# Patient Record
Sex: FEMALE | Race: BLACK OR AFRICAN AMERICAN | NOT HISPANIC OR LATINO
[De-identification: names, ages, dates, MRNs, and addresses within clinical notes are randomized per-mention and may not be internally consistent; named-entity substitution may affect disease eponyms.]

---

## 2017-02-14 ENCOUNTER — APPOINTMENT (OUTPATIENT)
Dept: RADIOLOGY | Facility: CLINIC | Age: 72
End: 2017-02-14

## 2017-02-14 ENCOUNTER — OUTPATIENT (OUTPATIENT)
Dept: OUTPATIENT SERVICES | Facility: HOSPITAL | Age: 72
LOS: 1 days | End: 2017-02-14
Payer: COMMERCIAL

## 2017-02-14 DIAGNOSIS — M16.11 UNILATERAL PRIMARY OSTEOARTHRITIS, RIGHT HIP: ICD-10-CM

## 2017-02-14 DIAGNOSIS — Z90.12 ACQUIRED ABSENCE OF LEFT BREAST AND NIPPLE: Chronic | ICD-10-CM

## 2017-02-14 DIAGNOSIS — Z90.49 ACQUIRED ABSENCE OF OTHER SPECIFIED PARTS OF DIGESTIVE TRACT: Chronic | ICD-10-CM

## 2017-02-14 PROCEDURE — 27093 INJECTION FOR HIP X-RAY: CPT

## 2017-02-14 PROCEDURE — 73525 CONTRAST X-RAY OF HIP: CPT

## 2018-10-31 ENCOUNTER — EMERGENCY (EMERGENCY)
Facility: HOSPITAL | Age: 73
LOS: 0 days | Discharge: HOME | End: 2018-10-31
Attending: EMERGENCY MEDICINE
Payer: COMMERCIAL

## 2018-10-31 VITALS
WEIGHT: 139.99 LBS | DIASTOLIC BLOOD PRESSURE: 86 MMHG | OXYGEN SATURATION: 100 % | RESPIRATION RATE: 16 BRPM | SYSTOLIC BLOOD PRESSURE: 147 MMHG | HEART RATE: 64 BPM | TEMPERATURE: 98 F | HEIGHT: 62 IN

## 2018-10-31 VITALS — HEART RATE: 60 BPM | DIASTOLIC BLOOD PRESSURE: 70 MMHG | SYSTOLIC BLOOD PRESSURE: 152 MMHG

## 2018-10-31 DIAGNOSIS — Z88.0 ALLERGY STATUS TO PENICILLIN: ICD-10-CM

## 2018-10-31 DIAGNOSIS — Z88.9 ALLERGY STATUS TO UNSPECIFIED DRUGS, MEDICAMENTS AND BIOLOGICAL SUBSTANCES: ICD-10-CM

## 2018-10-31 DIAGNOSIS — Z90.49 ACQUIRED ABSENCE OF OTHER SPECIFIED PARTS OF DIGESTIVE TRACT: Chronic | ICD-10-CM

## 2018-10-31 DIAGNOSIS — K29.70 GASTRITIS, UNSPECIFIED, WITHOUT BLEEDING: ICD-10-CM

## 2018-10-31 DIAGNOSIS — Z90.12 ACQUIRED ABSENCE OF LEFT BREAST AND NIPPLE: Chronic | ICD-10-CM

## 2018-10-31 DIAGNOSIS — R11.10 VOMITING, UNSPECIFIED: ICD-10-CM

## 2018-10-31 DIAGNOSIS — R11.2 NAUSEA WITH VOMITING, UNSPECIFIED: ICD-10-CM

## 2018-10-31 DIAGNOSIS — R10.13 EPIGASTRIC PAIN: ICD-10-CM

## 2018-10-31 LAB
ALBUMIN SERPL ELPH-MCNC: 3.4 G/DL — SIGNIFICANT CHANGE UP (ref 3.3–5)
ALP SERPL-CCNC: 124 U/L — HIGH (ref 40–120)
ALT FLD-CCNC: 20 U/L — SIGNIFICANT CHANGE UP (ref 12–78)
ANION GAP SERPL CALC-SCNC: 11 MMOL/L — SIGNIFICANT CHANGE UP (ref 5–17)
APPEARANCE UR: CLEAR — SIGNIFICANT CHANGE UP
APTT BLD: 31 SEC — SIGNIFICANT CHANGE UP (ref 27.5–36.3)
AST SERPL-CCNC: 25 U/L — SIGNIFICANT CHANGE UP (ref 15–37)
BASOPHILS # BLD AUTO: 0.03 K/UL — SIGNIFICANT CHANGE UP (ref 0–0.2)
BASOPHILS NFR BLD AUTO: 0.3 % — SIGNIFICANT CHANGE UP (ref 0–2)
BILIRUB SERPL-MCNC: 0.6 MG/DL — SIGNIFICANT CHANGE UP (ref 0.2–1.2)
BILIRUB UR-MCNC: NEGATIVE — SIGNIFICANT CHANGE UP
BUN SERPL-MCNC: 13 MG/DL — SIGNIFICANT CHANGE UP (ref 7–23)
CALCIUM SERPL-MCNC: 9 MG/DL — SIGNIFICANT CHANGE UP (ref 8.5–10.1)
CHLORIDE SERPL-SCNC: 98 MMOL/L — SIGNIFICANT CHANGE UP (ref 96–108)
CK MB BLD-MCNC: 1.3 % — SIGNIFICANT CHANGE UP (ref 0–3.5)
CK MB CFR SERPL CALC: 1.3 NG/ML — SIGNIFICANT CHANGE UP (ref 0.5–3.6)
CK SERPL-CCNC: 97 U/L — SIGNIFICANT CHANGE UP (ref 26–192)
CO2 SERPL-SCNC: 29 MMOL/L — SIGNIFICANT CHANGE UP (ref 22–31)
COLOR SPEC: YELLOW — SIGNIFICANT CHANGE UP
CREAT SERPL-MCNC: 1 MG/DL — SIGNIFICANT CHANGE UP (ref 0.5–1.3)
DIFF PNL FLD: NEGATIVE — SIGNIFICANT CHANGE UP
EOSINOPHIL # BLD AUTO: 0.05 K/UL — SIGNIFICANT CHANGE UP (ref 0–0.5)
EOSINOPHIL NFR BLD AUTO: 0.5 % — SIGNIFICANT CHANGE UP (ref 0–6)
GLUCOSE SERPL-MCNC: 111 MG/DL — HIGH (ref 70–99)
GLUCOSE UR QL: NEGATIVE MG/DL — SIGNIFICANT CHANGE UP
HCT VFR BLD CALC: 45.5 % — HIGH (ref 34.5–45)
HGB BLD-MCNC: 14.7 G/DL — SIGNIFICANT CHANGE UP (ref 11.5–15.5)
IMM GRANULOCYTES NFR BLD AUTO: 0.4 % — SIGNIFICANT CHANGE UP (ref 0–1.5)
INR BLD: 1.03 RATIO — SIGNIFICANT CHANGE UP (ref 0.88–1.16)
KETONES UR-MCNC: NEGATIVE — SIGNIFICANT CHANGE UP
LACTATE SERPL-SCNC: 1.6 MMOL/L — SIGNIFICANT CHANGE UP (ref 0.7–2)
LEUKOCYTE ESTERASE UR-ACNC: NEGATIVE — SIGNIFICANT CHANGE UP
LIDOCAIN IGE QN: 157 U/L — SIGNIFICANT CHANGE UP (ref 73–393)
LYMPHOCYTES # BLD AUTO: 1.17 K/UL — SIGNIFICANT CHANGE UP (ref 1–3.3)
LYMPHOCYTES # BLD AUTO: 11.7 % — LOW (ref 13–44)
MCHC RBC-ENTMCNC: 28.5 PG — SIGNIFICANT CHANGE UP (ref 27–34)
MCHC RBC-ENTMCNC: 32.3 GM/DL — SIGNIFICANT CHANGE UP (ref 32–36)
MCV RBC AUTO: 88.3 FL — SIGNIFICANT CHANGE UP (ref 80–100)
MONOCYTES # BLD AUTO: 0.68 K/UL — SIGNIFICANT CHANGE UP (ref 0–0.9)
MONOCYTES NFR BLD AUTO: 6.8 % — SIGNIFICANT CHANGE UP (ref 2–14)
NEUTROPHILS # BLD AUTO: 8.02 K/UL — HIGH (ref 1.8–7.4)
NEUTROPHILS NFR BLD AUTO: 80.3 % — HIGH (ref 43–77)
NITRITE UR-MCNC: NEGATIVE — SIGNIFICANT CHANGE UP
PH UR: 8 — SIGNIFICANT CHANGE UP (ref 5–8)
PLATELET # BLD AUTO: 219 K/UL — SIGNIFICANT CHANGE UP (ref 150–400)
POTASSIUM SERPL-MCNC: 3.5 MMOL/L — SIGNIFICANT CHANGE UP (ref 3.5–5.3)
POTASSIUM SERPL-SCNC: 3.5 MMOL/L — SIGNIFICANT CHANGE UP (ref 3.5–5.3)
PROT SERPL-MCNC: 8 GM/DL — SIGNIFICANT CHANGE UP (ref 6–8.3)
PROT UR-MCNC: 15 MG/DL
PROTHROM AB SERPL-ACNC: 11.5 SEC — SIGNIFICANT CHANGE UP (ref 10–12.9)
RBC # BLD: 5.15 M/UL — SIGNIFICANT CHANGE UP (ref 3.8–5.2)
RBC # FLD: 12.8 % — SIGNIFICANT CHANGE UP (ref 10.3–14.5)
SODIUM SERPL-SCNC: 138 MMOL/L — SIGNIFICANT CHANGE UP (ref 135–145)
SP GR SPEC: 1.02 — SIGNIFICANT CHANGE UP (ref 1.01–1.02)
TROPONIN I SERPL-MCNC: <.015 NG/ML — SIGNIFICANT CHANGE UP (ref 0.01–0.04)
UROBILINOGEN FLD QL: NEGATIVE MG/DL — SIGNIFICANT CHANGE UP
WBC # BLD: 9.99 K/UL — SIGNIFICANT CHANGE UP (ref 3.8–10.5)
WBC # FLD AUTO: 9.99 K/UL — SIGNIFICANT CHANGE UP (ref 3.8–10.5)

## 2018-10-31 PROCEDURE — 71045 X-RAY EXAM CHEST 1 VIEW: CPT | Mod: 26

## 2018-10-31 PROCEDURE — 99285 EMERGENCY DEPT VISIT HI MDM: CPT

## 2018-10-31 PROCEDURE — 74177 CT ABD & PELVIS W/CONTRAST: CPT | Mod: 26

## 2018-10-31 RX ORDER — FAMOTIDINE 10 MG/ML
20 INJECTION INTRAVENOUS ONCE
Qty: 0 | Refills: 0 | Status: COMPLETED | OUTPATIENT
Start: 2018-10-31 | End: 2018-10-31

## 2018-10-31 RX ORDER — SUCRALFATE 1 G
1 TABLET ORAL
Qty: 7 | Refills: 0 | OUTPATIENT
Start: 2018-10-31

## 2018-10-31 RX ORDER — SODIUM CHLORIDE 9 MG/ML
1000 INJECTION INTRAMUSCULAR; INTRAVENOUS; SUBCUTANEOUS ONCE
Qty: 0 | Refills: 0 | Status: COMPLETED | OUTPATIENT
Start: 2018-10-31 | End: 2018-10-31

## 2018-10-31 RX ORDER — ONDANSETRON 8 MG/1
8 TABLET, FILM COATED ORAL ONCE
Qty: 0 | Refills: 0 | Status: COMPLETED | OUTPATIENT
Start: 2018-10-31 | End: 2018-10-31

## 2018-10-31 RX ORDER — ONDANSETRON 8 MG/1
1 TABLET, FILM COATED ORAL
Qty: 10 | Refills: 0 | OUTPATIENT
Start: 2018-10-31

## 2018-10-31 RX ADMIN — FAMOTIDINE 20 MILLIGRAM(S): 10 INJECTION INTRAVENOUS at 19:55

## 2018-10-31 RX ADMIN — SODIUM CHLORIDE 2000 MILLILITER(S): 9 INJECTION INTRAMUSCULAR; INTRAVENOUS; SUBCUTANEOUS at 19:56

## 2018-10-31 RX ADMIN — ONDANSETRON 8 MILLIGRAM(S): 8 TABLET, FILM COATED ORAL at 19:55

## 2018-10-31 NOTE — ED PROVIDER NOTE - ATTENDING CONTRIBUTION TO CARE
Patient evaluated and seen with LUDMILA Armstrong agree with above history and physical - pt examined and seen by me personally - findings as seen: pt seen for epigastric pain nausea/vomiting without diarrhea. Pt otherwise without fever/chills. Has had prior CBD obstruction with stent exchange 2 years ago, no jaundice noted. Signed out to Dr. Berry pending CT abd/pelvis. Patient evaluated and seen with LUDMILA Armstrong agree with above history and physical - pt examined and seen by me personally - findings as seen: pt seen for epigastric pain nausea/vomiting without diarrhea. Pt otherwise without fever/chills. Has had prior CBD obstruction with stent exchange 2 years ago, no jaundice noted. Well appearing - CT abd appears to have no acute pathology - will dc with follow up with PMD or GI and given zofran/pepcid.

## 2018-10-31 NOTE — ED PROVIDER NOTE - PROGRESS NOTE DETAILS
labs, urine ok. trop neg, neg lactate .ct neg for acute pathology. pt states she is feeling better after meds. no vomiting episodes in ed. tolerating po. pt is well appearing, VSS, afebrile. pt ok for dc with pcp/GI f/u, son at bedside will take pt home  Discussed results and outcome of testing with the patient.  Patient advised to please follow up with their primary care doctor within the next 24 hours and return to the Emergency Department for worsening symptoms or any other concerns.  Patient advised that their doctor may call  to follow up on the specific results of the tests performed today in the emergency department.

## 2018-10-31 NOTE — ED PROVIDER NOTE - PHYSICAL EXAMINATION
Gen: Alert, NAD, not toxic  Head: NC, AT, PERRL, EOMI, normal lids/conjunctiva  ENT: normal hearing, patent oropharynx without erythema/exudate, uvula midline  Neck: +supple, no tenderness/meningismus/JVD, +Trachea midline  Pulm: Bilateral BS, normal resp effort, no wheeze/stridor/retractions  CV: RRR, no M/R/G, +dist pulses  Abd: soft, +epigastric tenderness, ND, +BS, no hepatosplenomegaly  Mskel: no edema/erythema/cyanosis  Skin: no rash  Neuro: AAOx3, no sensory/motor deficits, CN 2-12 intact

## 2018-10-31 NOTE — ED PROVIDER NOTE - OBJECTIVE STATEMENT
71 y/o female with PMH hiatal hernia, GERD, HTN, breast CA s/p L mastectomy 2011, CBD obstruction s/p stent here c/o n/v, epigastric pain x 1 day. pt states she had about 7 episodes of vomiting last night and today. she described pain as constant, crampy like, epigastric area without radiation of pain. had normal BM this am. no fevers. pt also states she's been urinating more frequently. no dysuria. pt otherwise denies cp, sob, HA, cough    ROS: No fever/chills. No eye pain/changes in vision, No ear pain/sore throat/dysphagia, No chest pain/palpitations. No SOB/cough/. ++ abdominal pain, N/V no D, no black/bloody bm. +frequency, No dysuria/discharge, No headache. No Dizziness.    No rashes or breaks in skin. No numbness/tingling/weakness. 73 y/o female with PMH hiatal hernia, GERD, HTN, breast CA s/p L mastectomy 2011, psh cholecystectomy years ago and CBD obstruction s/p stent exchange x2 yrs ago here c/o n/v, epigastric pain x 1 day. pt states she had about 7 episodes of vomiting last night and today. she described pain as constant, crampy like, epigastric area without radiation of pain. had normal BM this am. no fevers. pt also states she's been urinating more frequently. no dysuria. pt otherwise denies cp, sob, HA, cough    ROS: No fever/chills. No eye pain/changes in vision, No ear pain/sore throat/dysphagia, No chest pain/palpitations. No SOB/cough/. ++ abdominal pain, N/V no D, no black/bloody bm. +frequency, No dysuria/discharge, No headache. No Dizziness.    No rashes or breaks in skin. No numbness/tingling/weakness.

## 2018-10-31 NOTE — ED PROVIDER NOTE - MEDICAL DECISION MAKING DETAILS
pt here with n/v, epigastric pain, pt has hx CBD stone s/p stent, will check labs, cardiacs, ivf, antiemetics, CT, monitor

## 2018-10-31 NOTE — ED ADULT TRIAGE NOTE - CHIEF COMPLAINT QUOTE
Pt states epigastric pain with n/v/d since last night. Pt states he have hx cholecystectomy. Pt denies chest pain at triage

## 2018-11-01 LAB
CULTURE RESULTS: NO GROWTH — SIGNIFICANT CHANGE UP
SPECIMEN SOURCE: SIGNIFICANT CHANGE UP

## 2018-11-25 ENCOUNTER — INPATIENT (INPATIENT)
Facility: HOSPITAL | Age: 73
LOS: 4 days | Discharge: HOME | End: 2018-11-30
Attending: INTERNAL MEDICINE | Admitting: INTERNAL MEDICINE
Payer: COMMERCIAL

## 2018-11-25 VITALS
DIASTOLIC BLOOD PRESSURE: 79 MMHG | SYSTOLIC BLOOD PRESSURE: 185 MMHG | TEMPERATURE: 99 F | HEART RATE: 63 BPM | OXYGEN SATURATION: 99 % | RESPIRATION RATE: 16 BRPM

## 2018-11-25 DIAGNOSIS — Z90.12 ACQUIRED ABSENCE OF LEFT BREAST AND NIPPLE: Chronic | ICD-10-CM

## 2018-11-25 DIAGNOSIS — Z90.49 ACQUIRED ABSENCE OF OTHER SPECIFIED PARTS OF DIGESTIVE TRACT: Chronic | ICD-10-CM

## 2018-11-25 PROCEDURE — 99285 EMERGENCY DEPT VISIT HI MDM: CPT | Mod: 25

## 2018-11-25 NOTE — ED ADULT TRIAGE NOTE - CHIEF COMPLAINT QUOTE
pt c/o abd pain, vomited undigested food this evening, seen 1 month ago possible bowel obstruction at that time as per family (multi allergies)

## 2018-11-26 DIAGNOSIS — Z29.9 ENCOUNTER FOR PROPHYLACTIC MEASURES, UNSPECIFIED: ICD-10-CM

## 2018-11-26 DIAGNOSIS — M19.90 UNSPECIFIED OSTEOARTHRITIS, UNSPECIFIED SITE: ICD-10-CM

## 2018-11-26 DIAGNOSIS — I10 ESSENTIAL (PRIMARY) HYPERTENSION: ICD-10-CM

## 2018-11-26 DIAGNOSIS — K83.09 OTHER CHOLANGITIS: ICD-10-CM

## 2018-11-26 LAB
ALBUMIN SERPL ELPH-MCNC: 2.6 G/DL — LOW (ref 3.3–5)
ALBUMIN SERPL ELPH-MCNC: 3.4 G/DL — SIGNIFICANT CHANGE UP (ref 3.3–5)
ALP SERPL-CCNC: 196 U/L — HIGH (ref 40–120)
ALP SERPL-CCNC: 222 U/L — HIGH (ref 40–120)
ALT FLD-CCNC: 186 U/L — HIGH (ref 12–78)
ALT FLD-CCNC: 200 U/L — HIGH (ref 12–78)
ANION GAP SERPL CALC-SCNC: 11 MMOL/L — SIGNIFICANT CHANGE UP (ref 5–17)
ANION GAP SERPL CALC-SCNC: 9 MMOL/L — SIGNIFICANT CHANGE UP (ref 5–17)
APPEARANCE UR: CLEAR — SIGNIFICANT CHANGE UP
APTT BLD: 30.2 SEC — SIGNIFICANT CHANGE UP (ref 27.5–36.3)
AST SERPL-CCNC: 468 U/L — HIGH (ref 15–37)
AST SERPL-CCNC: 633 U/L — HIGH (ref 15–37)
BASOPHILS # BLD AUTO: 0.06 K/UL — SIGNIFICANT CHANGE UP (ref 0–0.2)
BASOPHILS NFR BLD AUTO: 0.2 % — SIGNIFICANT CHANGE UP (ref 0–2)
BILIRUB SERPL-MCNC: 1.6 MG/DL — HIGH (ref 0.2–1.2)
BILIRUB SERPL-MCNC: 2.5 MG/DL — HIGH (ref 0.2–1.2)
BILIRUB UR-MCNC: NEGATIVE — SIGNIFICANT CHANGE UP
BUN SERPL-MCNC: 11 MG/DL — SIGNIFICANT CHANGE UP (ref 7–23)
BUN SERPL-MCNC: 12 MG/DL — SIGNIFICANT CHANGE UP (ref 7–23)
CALCIUM SERPL-MCNC: 7.9 MG/DL — LOW (ref 8.5–10.1)
CALCIUM SERPL-MCNC: 9 MG/DL — SIGNIFICANT CHANGE UP (ref 8.5–10.1)
CHLORIDE SERPL-SCNC: 100 MMOL/L — SIGNIFICANT CHANGE UP (ref 96–108)
CHLORIDE SERPL-SCNC: 102 MMOL/L — SIGNIFICANT CHANGE UP (ref 96–108)
CK MB CFR SERPL CALC: <1 NG/ML — SIGNIFICANT CHANGE UP (ref 0.5–3.6)
CO2 SERPL-SCNC: 26 MMOL/L — SIGNIFICANT CHANGE UP (ref 22–31)
CO2 SERPL-SCNC: 26 MMOL/L — SIGNIFICANT CHANGE UP (ref 22–31)
COLOR SPEC: YELLOW — SIGNIFICANT CHANGE UP
CREAT SERPL-MCNC: 0.87 MG/DL — SIGNIFICANT CHANGE UP (ref 0.5–1.3)
CREAT SERPL-MCNC: 1.15 MG/DL — SIGNIFICANT CHANGE UP (ref 0.5–1.3)
DIFF PNL FLD: NEGATIVE — SIGNIFICANT CHANGE UP
EOSINOPHIL # BLD AUTO: 0 K/UL — SIGNIFICANT CHANGE UP (ref 0–0.5)
EOSINOPHIL NFR BLD AUTO: 0 % — SIGNIFICANT CHANGE UP (ref 0–6)
GLUCOSE SERPL-MCNC: 160 MG/DL — HIGH (ref 70–99)
GLUCOSE SERPL-MCNC: 166 MG/DL — HIGH (ref 70–99)
GLUCOSE UR QL: 50 MG/DL
HCT VFR BLD CALC: 38.5 % — SIGNIFICANT CHANGE UP (ref 34.5–45)
HCT VFR BLD CALC: 45.7 % — HIGH (ref 34.5–45)
HGB BLD-MCNC: 12.5 G/DL — SIGNIFICANT CHANGE UP (ref 11.5–15.5)
HGB BLD-MCNC: 14.8 G/DL — SIGNIFICANT CHANGE UP (ref 11.5–15.5)
IMM GRANULOCYTES NFR BLD AUTO: 0.9 % — SIGNIFICANT CHANGE UP (ref 0–1.5)
INR BLD: 1.02 RATIO — SIGNIFICANT CHANGE UP (ref 0.88–1.16)
KETONES UR-MCNC: NEGATIVE — SIGNIFICANT CHANGE UP
LACTATE SERPL-SCNC: 4.1 MMOL/L — CRITICAL HIGH (ref 0.7–2)
LEUKOCYTE ESTERASE UR-ACNC: NEGATIVE — SIGNIFICANT CHANGE UP
LIDOCAIN IGE QN: 174 U/L — SIGNIFICANT CHANGE UP (ref 73–393)
LYMPHOCYTES # BLD AUTO: 0.57 K/UL — LOW (ref 1–3.3)
LYMPHOCYTES # BLD AUTO: 2.3 % — LOW (ref 13–44)
MCHC RBC-ENTMCNC: 29 PG — SIGNIFICANT CHANGE UP (ref 27–34)
MCHC RBC-ENTMCNC: 29.4 PG — SIGNIFICANT CHANGE UP (ref 27–34)
MCHC RBC-ENTMCNC: 32.4 GM/DL — SIGNIFICANT CHANGE UP (ref 32–36)
MCHC RBC-ENTMCNC: 32.5 GM/DL — SIGNIFICANT CHANGE UP (ref 32–36)
MCV RBC AUTO: 89.3 FL — SIGNIFICANT CHANGE UP (ref 80–100)
MCV RBC AUTO: 90.9 FL — SIGNIFICANT CHANGE UP (ref 80–100)
MONOCYTES # BLD AUTO: 1.03 K/UL — HIGH (ref 0–0.9)
MONOCYTES NFR BLD AUTO: 4.1 % — SIGNIFICANT CHANGE UP (ref 2–14)
NEUTROPHILS # BLD AUTO: 23.39 K/UL — HIGH (ref 1.8–7.4)
NEUTROPHILS NFR BLD AUTO: 92.5 % — HIGH (ref 43–77)
NITRITE UR-MCNC: NEGATIVE — SIGNIFICANT CHANGE UP
NRBC # BLD: 0 /100 WBCS — SIGNIFICANT CHANGE UP (ref 0–0)
PH UR: 8 — SIGNIFICANT CHANGE UP (ref 5–8)
PLATELET # BLD AUTO: 130 K/UL — LOW (ref 150–400)
PLATELET # BLD AUTO: 171 K/UL — SIGNIFICANT CHANGE UP (ref 150–400)
POTASSIUM SERPL-MCNC: 2.8 MMOL/L — CRITICAL LOW (ref 3.5–5.3)
POTASSIUM SERPL-MCNC: 4.3 MMOL/L — SIGNIFICANT CHANGE UP (ref 3.5–5.3)
POTASSIUM SERPL-SCNC: 2.8 MMOL/L — CRITICAL LOW (ref 3.5–5.3)
POTASSIUM SERPL-SCNC: 4.3 MMOL/L — SIGNIFICANT CHANGE UP (ref 3.5–5.3)
PROT SERPL-MCNC: 6.2 GM/DL — SIGNIFICANT CHANGE UP (ref 6–8.3)
PROT SERPL-MCNC: 8.5 GM/DL — HIGH (ref 6–8.3)
PROT UR-MCNC: NEGATIVE MG/DL — SIGNIFICANT CHANGE UP
PROTHROM AB SERPL-ACNC: 11.4 SEC — SIGNIFICANT CHANGE UP (ref 10–12.9)
RBC # BLD: 4.31 M/UL — SIGNIFICANT CHANGE UP (ref 3.8–5.2)
RBC # BLD: 5.03 M/UL — SIGNIFICANT CHANGE UP (ref 3.8–5.2)
RBC # FLD: 12.9 % — SIGNIFICANT CHANGE UP (ref 10.3–14.5)
RBC # FLD: 13.2 % — SIGNIFICANT CHANGE UP (ref 10.3–14.5)
SODIUM SERPL-SCNC: 137 MMOL/L — SIGNIFICANT CHANGE UP (ref 135–145)
SODIUM SERPL-SCNC: 137 MMOL/L — SIGNIFICANT CHANGE UP (ref 135–145)
SP GR SPEC: 1.01 — SIGNIFICANT CHANGE UP (ref 1.01–1.02)
TROPONIN I SERPL-MCNC: <.015 NG/ML — SIGNIFICANT CHANGE UP (ref 0.01–0.04)
UROBILINOGEN FLD QL: NEGATIVE MG/DL — SIGNIFICANT CHANGE UP
WBC # BLD: 15.49 K/UL — HIGH (ref 3.8–10.5)
WBC # BLD: 25.27 K/UL — HIGH (ref 3.8–10.5)
WBC # FLD AUTO: 15.49 K/UL — HIGH (ref 3.8–10.5)
WBC # FLD AUTO: 25.27 K/UL — HIGH (ref 3.8–10.5)

## 2018-11-26 PROCEDURE — 74177 CT ABD & PELVIS W/CONTRAST: CPT | Mod: 26

## 2018-11-26 PROCEDURE — 71045 X-RAY EXAM CHEST 1 VIEW: CPT | Mod: 26

## 2018-11-26 PROCEDURE — 93010 ELECTROCARDIOGRAM REPORT: CPT

## 2018-11-26 PROCEDURE — 76700 US EXAM ABDOM COMPLETE: CPT | Mod: 26

## 2018-11-26 PROCEDURE — 99223 1ST HOSP IP/OBS HIGH 75: CPT

## 2018-11-26 PROCEDURE — 12345: CPT | Mod: NC

## 2018-11-26 RX ORDER — CIPROFLOXACIN LACTATE 400MG/40ML
400 VIAL (ML) INTRAVENOUS EVERY 12 HOURS
Qty: 0 | Refills: 0 | Status: DISCONTINUED | OUTPATIENT
Start: 2018-11-26 | End: 2018-11-30

## 2018-11-26 RX ORDER — IBUPROFEN 200 MG
600 TABLET ORAL EVERY 6 HOURS
Qty: 0 | Refills: 0 | Status: DISCONTINUED | OUTPATIENT
Start: 2018-11-26 | End: 2018-11-30

## 2018-11-26 RX ORDER — KETOROLAC TROMETHAMINE 30 MG/ML
15 SYRINGE (ML) INJECTION ONCE
Qty: 0 | Refills: 0 | Status: DISCONTINUED | OUTPATIENT
Start: 2018-11-26 | End: 2018-11-26

## 2018-11-26 RX ORDER — METOCLOPRAMIDE HCL 10 MG
10 TABLET ORAL ONCE
Qty: 0 | Refills: 0 | Status: COMPLETED | OUTPATIENT
Start: 2018-11-26 | End: 2018-11-26

## 2018-11-26 RX ORDER — CIPROFLOXACIN LACTATE 400MG/40ML
400 VIAL (ML) INTRAVENOUS ONCE
Qty: 0 | Refills: 0 | Status: COMPLETED | OUTPATIENT
Start: 2018-11-26 | End: 2018-11-26

## 2018-11-26 RX ORDER — SODIUM CHLORIDE 9 MG/ML
1000 INJECTION, SOLUTION INTRAVENOUS
Qty: 0 | Refills: 0 | Status: DISCONTINUED | OUTPATIENT
Start: 2018-11-26 | End: 2018-11-28

## 2018-11-26 RX ORDER — HEPARIN SODIUM 5000 [USP'U]/ML
5000 INJECTION INTRAVENOUS; SUBCUTANEOUS EVERY 8 HOURS
Qty: 0 | Refills: 0 | Status: DISCONTINUED | OUTPATIENT
Start: 2018-11-26 | End: 2018-11-28

## 2018-11-26 RX ORDER — POTASSIUM CHLORIDE 20 MEQ
40 PACKET (EA) ORAL EVERY 4 HOURS
Qty: 0 | Refills: 0 | Status: COMPLETED | OUTPATIENT
Start: 2018-11-26 | End: 2018-11-26

## 2018-11-26 RX ORDER — MORPHINE SULFATE 50 MG/1
4 CAPSULE, EXTENDED RELEASE ORAL ONCE
Qty: 0 | Refills: 0 | Status: DISCONTINUED | OUTPATIENT
Start: 2018-11-26 | End: 2018-11-26

## 2018-11-26 RX ORDER — ONDANSETRON 8 MG/1
8 TABLET, FILM COATED ORAL EVERY 8 HOURS
Qty: 0 | Refills: 0 | Status: DISCONTINUED | OUTPATIENT
Start: 2018-11-26 | End: 2018-11-30

## 2018-11-26 RX ORDER — METRONIDAZOLE 500 MG
500 TABLET ORAL ONCE
Qty: 0 | Refills: 0 | Status: COMPLETED | OUTPATIENT
Start: 2018-11-26 | End: 2018-11-26

## 2018-11-26 RX ORDER — MORPHINE SULFATE 50 MG/1
1 CAPSULE, EXTENDED RELEASE ORAL EVERY 4 HOURS
Qty: 0 | Refills: 0 | Status: DISCONTINUED | OUTPATIENT
Start: 2018-11-26 | End: 2018-11-30

## 2018-11-26 RX ORDER — FENTANYL CITRATE 50 UG/ML
50 INJECTION INTRAVENOUS ONCE
Qty: 0 | Refills: 0 | Status: DISCONTINUED | OUTPATIENT
Start: 2018-11-26 | End: 2018-11-26

## 2018-11-26 RX ORDER — KETOROLAC TROMETHAMINE 30 MG/ML
15 SYRINGE (ML) INJECTION EVERY 6 HOURS
Qty: 0 | Refills: 0 | Status: DISCONTINUED | OUTPATIENT
Start: 2018-11-26 | End: 2018-11-26

## 2018-11-26 RX ORDER — POTASSIUM CHLORIDE 20 MEQ
10 PACKET (EA) ORAL
Qty: 0 | Refills: 0 | Status: COMPLETED | OUTPATIENT
Start: 2018-11-26 | End: 2018-11-26

## 2018-11-26 RX ORDER — FAMOTIDINE 10 MG/ML
20 INJECTION INTRAVENOUS ONCE
Qty: 0 | Refills: 0 | Status: COMPLETED | OUTPATIENT
Start: 2018-11-26 | End: 2018-11-26

## 2018-11-26 RX ORDER — IOHEXOL 300 MG/ML
30 INJECTION, SOLUTION INTRAVENOUS ONCE
Qty: 0 | Refills: 0 | Status: COMPLETED | OUTPATIENT
Start: 2018-11-26 | End: 2018-11-26

## 2018-11-26 RX ORDER — METRONIDAZOLE 500 MG
500 TABLET ORAL EVERY 8 HOURS
Qty: 0 | Refills: 0 | Status: DISCONTINUED | OUTPATIENT
Start: 2018-11-26 | End: 2018-11-30

## 2018-11-26 RX ORDER — PANTOPRAZOLE SODIUM 20 MG/1
40 TABLET, DELAYED RELEASE ORAL
Qty: 0 | Refills: 0 | Status: DISCONTINUED | OUTPATIENT
Start: 2018-11-26 | End: 2018-11-30

## 2018-11-26 RX ORDER — SODIUM CHLORIDE 9 MG/ML
1000 INJECTION INTRAMUSCULAR; INTRAVENOUS; SUBCUTANEOUS ONCE
Qty: 0 | Refills: 0 | Status: COMPLETED | OUTPATIENT
Start: 2018-11-26 | End: 2018-11-26

## 2018-11-26 RX ADMIN — Medication 5 MILLIGRAM(S): at 11:52

## 2018-11-26 RX ADMIN — Medication 200 MILLIGRAM(S): at 05:08

## 2018-11-26 RX ADMIN — MORPHINE SULFATE 4 MILLIGRAM(S): 50 CAPSULE, EXTENDED RELEASE ORAL at 01:57

## 2018-11-26 RX ADMIN — FAMOTIDINE 20 MILLIGRAM(S): 10 INJECTION INTRAVENOUS at 01:57

## 2018-11-26 RX ADMIN — Medication 100 MILLIEQUIVALENT(S): at 09:01

## 2018-11-26 RX ADMIN — Medication 100 MILLIEQUIVALENT(S): at 11:01

## 2018-11-26 RX ADMIN — Medication 40 MILLIEQUIVALENT(S): at 09:01

## 2018-11-26 RX ADMIN — Medication 15 MILLIGRAM(S): at 03:33

## 2018-11-26 RX ADMIN — HEPARIN SODIUM 5000 UNIT(S): 5000 INJECTION INTRAVENOUS; SUBCUTANEOUS at 14:25

## 2018-11-26 RX ADMIN — SODIUM CHLORIDE 100 MILLILITER(S): 9 INJECTION, SOLUTION INTRAVENOUS at 07:49

## 2018-11-26 RX ADMIN — ONDANSETRON 8 MILLIGRAM(S): 8 TABLET, FILM COATED ORAL at 09:01

## 2018-11-26 RX ADMIN — Medication 200 MILLIGRAM(S): at 17:25

## 2018-11-26 RX ADMIN — Medication 100 MILLIGRAM(S): at 14:25

## 2018-11-26 RX ADMIN — Medication 100 MILLIEQUIVALENT(S): at 13:09

## 2018-11-26 RX ADMIN — HEPARIN SODIUM 5000 UNIT(S): 5000 INJECTION INTRAVENOUS; SUBCUTANEOUS at 23:07

## 2018-11-26 RX ADMIN — Medication 100 MILLIGRAM(S): at 23:07

## 2018-11-26 RX ADMIN — FENTANYL CITRATE 50 MICROGRAM(S): 50 INJECTION INTRAVENOUS at 03:34

## 2018-11-26 RX ADMIN — Medication 15 MILLIGRAM(S): at 04:29

## 2018-11-26 RX ADMIN — Medication 15 MILLIGRAM(S): at 06:53

## 2018-11-26 RX ADMIN — MORPHINE SULFATE 4 MILLIGRAM(S): 50 CAPSULE, EXTENDED RELEASE ORAL at 02:30

## 2018-11-26 RX ADMIN — SODIUM CHLORIDE 1000 MILLILITER(S): 9 INJECTION INTRAMUSCULAR; INTRAVENOUS; SUBCUTANEOUS at 02:00

## 2018-11-26 RX ADMIN — FENTANYL CITRATE 50 MICROGRAM(S): 50 INJECTION INTRAVENOUS at 04:29

## 2018-11-26 RX ADMIN — Medication 100 MILLIGRAM(S): at 06:44

## 2018-11-26 RX ADMIN — Medication 10 MILLIGRAM(S): at 01:57

## 2018-11-26 NOTE — ED PROVIDER NOTE - PHYSICAL EXAMINATION
Vitals: WNL  Gen: AAOx3, NAD, sitting comfortably in stretcher  Head: ncat, perrla, eomi b/l  Neck: supple, no lymphadenopathy, no midline deviation  Heart: rrr, no m/r/g  Lungs: CTA b/l, no rales/ronchi/wheezes  Abd: soft, tender in epigastrium, mild epigastric distension, no rebound or guarding  Ext: no clubbing/cyanosis/edema  Neuro: sensation and muscle strength intact b/l, steady gait

## 2018-11-26 NOTE — ED PROVIDER NOTE - OBJECTIVE STATEMENT
73 yo F with abd pain, n/v since this evening after eating food at [employer].  Pt. had prior bowel obstruction with similar presentation as per family member at bedside.  Same symptoms as last time.  Pt. vomited over 7 times since this evening, all unprocessed food that she ate recently.  No blood, no diarrhea.  She has severe epigastric pain, still nausea now.  No other complaints or inciting event.  She was feeling well yesterday.  ROS: negative for fever, cough, headache, chest pain, shortness of breath, abd pain, nausea, vomiting, diarrhea, rash, paresthesia, and weakness--all other systems reviewed are negative.   PMH: negative; Meds: Denies; SH: Denies smoking/drinking/drug use 71 yo F with abd pain, n/v since this evening after eating food at [employer].  Pt. had prior bowel obstruction with similar presentation as per family member at bedside.  Same symptoms as last time.  Pt. vomited over 7 times since this evening, all unprocessed food that she ate recently.  No blood, no diarrhea.  She has severe epigastric pain, still nausea now.  No other complaints or inciting event.  She was feeling well yesterday.  ROS: negative for fever, cough, headache, chest pain, shortness of breath, abd pain, nausea, vomiting, diarrhea, rash, paresthesia, and weakness--all other systems reviewed are negative.   PMH: hiatal hernia, GERD, HTN, breast CA; PSH: cholecystectomy years ago and CBD obstruction s/p stent exchange x2 yrs ago here, s/p L mastectomy 2011; Meds: See EMR; SH: Denies smoking/drinking/drug use

## 2018-11-26 NOTE — ED PROVIDER NOTE - PROGRESS NOTE DETAILS
labs and CT consistent with cholangitis, diffuse bile duct stones on CT, s/p lion  antbx started, pt. significantly improved after fentanyl and antiemetics  will admit to medicine, Dr. Paris consulted for GI

## 2018-11-26 NOTE — H&P ADULT - NSHPPHYSICALEXAM_GEN_ALL_CORE
GENERAL: NAD, well-groomed, well-developed  HEAD:  Atraumatic, Normocephalic  EYES: EOMI, PERRLA, conjunctiva and sclera clear  ENMT: No tonsillar erythema, exudates, or enlargement; Moist mucous membranes, No lesions  NECK: Supple, No JVD, Normal thyroid  NERVOUS SYSTEM:  Alert & Oriented X3, Good concentration  CHEST/LUNG: Clear to ascultation bilaterally; No rales, rhonchi, wheezing, or rubs  HEART: Regular rate and rhythm; No murmurs, rubs, or gallops  ABDOMEN: Soft, tender without rebound or guarding, Nondistended; Bowel sounds present  EXTREMITIES: no clubbing, cyanosis, or edema  SKIN: no rashes or lesions   PSYCH: normal affect and behavior

## 2018-11-26 NOTE — ED PROVIDER NOTE - CARE PLAN
Principal Discharge DX:	Vomiting, intractability of vomiting not specified, presence of nausea not specified, unspecified vomiting type Principal Discharge DX:	Cholangitis

## 2018-11-26 NOTE — H&P ADULT - PSH
Ankle Fracture  x3 surgeries  of left ankle/foot  Elbow Injury  tx with internal fixation of left elbow s/p accident in 1996  H/O left mastectomy  2011  History of cholecystectomy    umbilical hernia  with gallbladder removal Jan 2010

## 2018-11-26 NOTE — ED ADULT NURSE NOTE - OBJECTIVE STATEMENT
Pt is A&O X3, presents w/ complaints of diffuse abdominal pain associated w/ nausea, vomiting onset last night. Pt noted actively vomiting in the ED, nbnb

## 2018-11-26 NOTE — H&P ADULT - HISTORY OF PRESENT ILLNESS
Pt admitted for cholangitis, in progress 72 year old woman with PMH hiatal hernia, GERD, HTN, breast CA s/p L mastectomy 2011, psh cholecystectomy years ago and CBD obstruction s/p stent exchange x2 yrs ago here c/o n/v, epigastric pain.  She denies fever, chills, bloody stool.    In the ED, CT ab/plv shows biliary enhancement concerning for cholangitis, several bile duct stones.

## 2018-11-26 NOTE — ED ADULT NURSE NOTE - NSIMPLEMENTINTERV_GEN_ALL_ED
Implemented All Universal Safety Interventions:  [place] to call system. Call bell, personal items and telephone within reach. Instruct patient to call for assistance. Room bathroom lighting operational. Non-slip footwear when patient is off stretcher. Physically safe environment: no spills, clutter or unnecessary equipment. Stretcher in lowest position, wheels locked, appropriate side rails in place.

## 2018-11-26 NOTE — PATIENT PROFILE ADULT - NSPROEDALEARNPREFOTH_GEN_A_NUR
audio/computer/internet/skill demonstration/verbal instruction/group instruction/individual instruction/pictorial

## 2018-11-26 NOTE — PROGRESS NOTE ADULT - SUBJECTIVE AND OBJECTIVE BOX
72 year old woman with PMH hiatal hernia, OA, osteoporosis, GERD w/ Hiatal Hernia, Hypothyroid, CAD post MI, HTN, breast CA s/p L mastectomy 2011, psh cholecystectomy years ago and CBD obstruction status post cholecystectomy and stent exchange x2 yrs ago who p/w n/v & epigastric pain. Labs showed elevated LFTs. CT showedintrahepatic and extrahepatic ductal dilation due to extensive choledocholithiasis in the right hepatic duct, w/ biliary enhancement concerning for cholangitis. Exam significant for RUQ tenderness w/o guarding. Pt will need ERCP, GI is following. Pt is on Cipro and flagyl. US showed stones noted in the dilated cystic duct remanent. IV and PO KCl given for hypokalemia.

## 2018-11-26 NOTE — H&P ADULT - PROBLEM SELECTOR PLAN 1
Pt will need ERCP  Continue IVF  Zofran PRN for nausea, vomiting  Continue cipro and flagyl  GI consult  NPO  Analgesia PRN

## 2018-11-26 NOTE — ED ADULT NURSE NOTE - PMH
Acute Iritis, Right Eye    Arthritis    Autoimmune Disease  s/p diovan/hydrochlorothiazide and narcotic interaction s/p umbilical hernia with gallbladder removal. Patient currently being worked up for this.  Benign Focal Childhood Epilepsy    Breast Cancer  started work up in August 2010, dx in 12/2010, left mastectomy Feb 2011  Elbow fracture, left  metal hardware  Hiatal Hernia  gastroenteritis/ acid reflux controlled with nexium  HTN (Hypertension)  1996 dx and tx with meds  Hypothyroid    MI (myocardial infarction)  2010  Narcolepsy  during her 20's to 30's hasn't been on medications for this in over 25 years  OP (Osteoporosis)  low back since 1990's with osteopenia in right hip  Osteopenia  R-hip  Shingles  2009

## 2018-11-26 NOTE — ED PROVIDER NOTE - MEDICAL DECISION MAKING DETAILS
71 yo F with abd pain, n/v, concerning for obstruction  -CT abd/pel, basic labs, trop, cxr, ua, cx, lipase, lacate, ckmb, coags, monitor, ns hydration, pepcid, reglan, morphine  -f/u results, reeval

## 2018-11-26 NOTE — H&P ADULT - NSHPLABSRESULTS_GEN_ALL_CORE
Vital Signs Last 24 Hrs  T(C): 37.4 (2018 05:15), Max: 37.4 (2018 05:15)  T(F): 99.3 (2018 05:15), Max: 99.3 (2018 05:15)  HR: 107 (2018 06:10) (63 - 107)  BP: 143/69 (2018 05:15) (143/69 - 199/93)  BP(mean): --  RR: 12 (2018 06:10) (12 - 22)  SpO2: 95% (2018 06:10) (92% - 99%)        LABS:                        14.8   15.49 )-----------( 171      ( 2018 01:55 )             45.7         137  |  100  |  12  ----------------------------<  166<H>  4.3   |  26  |  1.15    Ca    9.0      2018 01:55    TPro  8.5<H>  /  Alb  3.4  /  TBili  1.6<H>  /  DBili  x   /  AST  633<H>  /  ALT  200<H>  /  AlkPhos  222<H>      PT/INR - ( 2018 01:55 )   PT: 11.4 sec;   INR: 1.02 ratio         PTT - ( 2018 01:55 )  PTT:30.2 sec  Urinalysis Basic - ( 2018 05:16 )    Color: Yellow / Appearance: Clear / S.010 / pH: x  Gluc: x / Ketone: Negative  / Bili: Negative / Urobili: Negative mg/dL   Blood: x / Protein: Negative mg/dL / Nitrite: Negative   Leuk Esterase: Negative / RBC: x / WBC x   Sq Epi: x / Non Sq Epi: x / Bacteria: x        RADIOLOGY & ADDITIONAL STUDIES:    CT ab/plv:  IMPRESSION: Cholecystectomy. Intrahepatic and extrahepatic ductal   dilation due to extensive choledocholithiasis in the right hepatic duct,   common bile duct and cystic duct remnant, measuring up to 14 mm. Biliary   enhancement concerning for cholangitis. ERCP recommended.

## 2018-11-26 NOTE — H&P ADULT - ASSESSMENT
Abscess of finger of left hand  06/26/2017    Active  Stoney Barnes 72 year old woman with PMH hiatal hernia, GERD, HTN, breast CA s/p L mastectomy 2011, psh cholecystectomy years ago and CBD obstruction s/p stent exchange x2 yrs ago here c/o n/v, epigastric pain.  She denies fever, chills, bloody stool.  Patient will require admission for at least 2 midnights as detailed below:    IMPROVE VTE Individual Risk Assessment          RISK                                                          Points    [  ] Previous VTE                                                3    [  ] Thrombophilia                                             2    [  ] Lower limb paralysis                                    2        (unable to hold up >15 seconds)      [  ] Current Cancer                                             2         (within 6 months)    [ x ] Immobilization > 24 hrs                              1    [  ] ICU/CCU stay > 24 hours                            1    [  x] Age > 60                                                    1    IMPROVE VTE Score ___2______

## 2018-11-26 NOTE — ED PROVIDER NOTE - PRINCIPAL DIAGNOSIS
Vomiting, intractability of vomiting not specified, presence of nausea not specified, unspecified vomiting type Cholangitis

## 2018-11-27 ENCOUNTER — TRANSCRIPTION ENCOUNTER (OUTPATIENT)
Age: 73
End: 2018-11-27

## 2018-11-27 DIAGNOSIS — L85.3 XEROSIS CUTIS: ICD-10-CM

## 2018-11-27 LAB
ALBUMIN SERPL ELPH-MCNC: 2.2 G/DL — LOW (ref 3.3–5)
ALP SERPL-CCNC: 208 U/L — HIGH (ref 40–120)
ALT FLD-CCNC: 138 U/L — HIGH (ref 12–78)
ANION GAP SERPL CALC-SCNC: 11 MMOL/L — SIGNIFICANT CHANGE UP (ref 5–17)
AST SERPL-CCNC: 195 U/L — HIGH (ref 15–37)
BILIRUB SERPL-MCNC: 5.5 MG/DL — HIGH (ref 0.2–1.2)
BUN SERPL-MCNC: 16 MG/DL — SIGNIFICANT CHANGE UP (ref 7–23)
CALCIUM SERPL-MCNC: 7.9 MG/DL — LOW (ref 8.5–10.1)
CHLORIDE SERPL-SCNC: 103 MMOL/L — SIGNIFICANT CHANGE UP (ref 96–108)
CO2 SERPL-SCNC: 22 MMOL/L — SIGNIFICANT CHANGE UP (ref 22–31)
CREAT SERPL-MCNC: 1.03 MG/DL — SIGNIFICANT CHANGE UP (ref 0.5–1.3)
CULTURE RESULTS: SIGNIFICANT CHANGE UP
GLUCOSE BLDC GLUCOMTR-MCNC: 101 MG/DL — HIGH (ref 70–99)
GLUCOSE BLDC GLUCOMTR-MCNC: 96 MG/DL — SIGNIFICANT CHANGE UP (ref 70–99)
GLUCOSE SERPL-MCNC: 114 MG/DL — HIGH (ref 70–99)
HCT VFR BLD CALC: 38.7 % — SIGNIFICANT CHANGE UP (ref 34.5–45)
HGB BLD-MCNC: 12.8 G/DL — SIGNIFICANT CHANGE UP (ref 11.5–15.5)
MAGNESIUM SERPL-MCNC: 1.9 MG/DL — SIGNIFICANT CHANGE UP (ref 1.6–2.6)
MCHC RBC-ENTMCNC: 29.2 PG — SIGNIFICANT CHANGE UP (ref 27–34)
MCHC RBC-ENTMCNC: 33.1 GM/DL — SIGNIFICANT CHANGE UP (ref 32–36)
MCV RBC AUTO: 88.2 FL — SIGNIFICANT CHANGE UP (ref 80–100)
NRBC # BLD: 0 /100 WBCS — SIGNIFICANT CHANGE UP (ref 0–0)
PHOSPHATE SERPL-MCNC: 2 MG/DL — LOW (ref 2.5–4.5)
PLATELET # BLD AUTO: 103 K/UL — LOW (ref 150–400)
POTASSIUM SERPL-MCNC: 4 MMOL/L — SIGNIFICANT CHANGE UP (ref 3.5–5.3)
POTASSIUM SERPL-SCNC: 4 MMOL/L — SIGNIFICANT CHANGE UP (ref 3.5–5.3)
PROT SERPL-MCNC: 5.8 GM/DL — LOW (ref 6–8.3)
RBC # BLD: 4.39 M/UL — SIGNIFICANT CHANGE UP (ref 3.8–5.2)
RBC # FLD: 13.4 % — SIGNIFICANT CHANGE UP (ref 10.3–14.5)
SODIUM SERPL-SCNC: 136 MMOL/L — SIGNIFICANT CHANGE UP (ref 135–145)
SPECIMEN SOURCE: SIGNIFICANT CHANGE UP
WBC # BLD: 18.93 K/UL — HIGH (ref 3.8–10.5)
WBC # FLD AUTO: 18.93 K/UL — HIGH (ref 3.8–10.5)

## 2018-11-27 PROCEDURE — 99232 SBSQ HOSP IP/OBS MODERATE 35: CPT

## 2018-11-27 PROCEDURE — 93010 ELECTROCARDIOGRAM REPORT: CPT

## 2018-11-27 RX ORDER — SOD,AMMONIUM,POTASSIUM LACTATE
1 CREAM (GRAM) TOPICAL
Qty: 0 | Refills: 0 | Status: DISCONTINUED | OUTPATIENT
Start: 2018-11-27 | End: 2018-11-30

## 2018-11-27 RX ADMIN — ONDANSETRON 8 MILLIGRAM(S): 8 TABLET, FILM COATED ORAL at 19:59

## 2018-11-27 RX ADMIN — Medication 100 MILLIGRAM(S): at 05:32

## 2018-11-27 RX ADMIN — Medication 600 MILLIGRAM(S): at 05:36

## 2018-11-27 RX ADMIN — ONDANSETRON 8 MILLIGRAM(S): 8 TABLET, FILM COATED ORAL at 05:32

## 2018-11-27 RX ADMIN — Medication 100 MILLIGRAM(S): at 14:07

## 2018-11-27 RX ADMIN — Medication 200 MILLIGRAM(S): at 17:18

## 2018-11-27 RX ADMIN — Medication 200 MILLIGRAM(S): at 05:33

## 2018-11-27 RX ADMIN — HEPARIN SODIUM 5000 UNIT(S): 5000 INJECTION INTRAVENOUS; SUBCUTANEOUS at 22:05

## 2018-11-27 RX ADMIN — Medication 1 APPLICATION(S): at 19:07

## 2018-11-27 RX ADMIN — SODIUM CHLORIDE 100 MILLILITER(S): 9 INJECTION, SOLUTION INTRAVENOUS at 14:08

## 2018-11-27 RX ADMIN — Medication 100 MILLIGRAM(S): at 22:04

## 2018-11-27 NOTE — PROGRESS NOTE ADULT - SUBJECTIVE AND OBJECTIVE BOX
Patient is a 72y old  Female who presents with a chief complaint of cholangitis (2018 18:08)      OVERNIGHT EVENTS:      REVIEW OF SYSTEMS: denies chest pain/SOB, diaphoresis, no F/C, cough, dizziness, headache, blurry vision, nausea, vomiting, abdominal pain. Rest unremarkable     MEDICATIONS  (STANDING):  ciprofloxacin   IVPB 400 milliGRAM(s) IV Intermittent every 12 hours  heparin  Injectable 5000 Unit(s) SubCutaneous every 8 hours  metroNIDAZOLE  IVPB 500 milliGRAM(s) IV Intermittent every 8 hours  pantoprazole    Tablet 40 milliGRAM(s) Oral before breakfast  predniSONE   Tablet 5 milliGRAM(s) Oral every other day  sodium chloride 0.45%. 1000 milliLiter(s) (100 mL/Hr) IV Continuous <Continuous>    MEDICATIONS  (PRN):  ibuprofen  Tablet. 600 milliGRAM(s) Oral every 6 hours PRN Temp greater or equal to 38C (100.4F), Mild Pain (1 - 3)  morphine  - Injectable 1 milliGRAM(s) IV Push every 4 hours PRN Severe Pain (7 - 10)  ondansetron Injectable 8 milliGRAM(s) IV Push every 8 hours PRN Nausea and/or Vomiting      Allergies    Diovan (Joint Pain; Muscle Pain)  Enalapril Maleate (Muscle Pain)  erythromycin (Other; Vomiting; Nausea)  Evista (Swelling)  hydrochlorothiazide (Headache; Joint Pain)  Lozol (Other)  methotrexate (Other)  Miacalcin (Pruritus)  penicillin (Angioedema; Swelling; Hives)  Premarin (Other)    Intolerances        SUBJECTIVE: in bed in NAD, no acute events overnight     T(F): 99.4 (18 @ 11:04), Max: 101.4 (18 @ 05:53)  HR: 90 (18 @ 11:04) (90 - 109)  BP: 140/68 (18 @ 11:04) (128/66 - 140/68)  RR: 16 (18 @ 11:04) (16 - 18)  SpO2: 96% (18 @ 11:04) (94% - 96%)  Wt(kg): --    PHYSICAL EXAM:  GENERAL: NAD, well-groomed, well-developed  HEAD:  Atraumatic, Normocephalic  EYES: EOMI, PERRLA, conjunctiva and sclera clear  ENMT: No tonsillar erythema, exudates, or enlargement; Moist mucous membranes, Good dentition, No lesions  NECK: Supple, No JVD, Normal thyroid  CHEST/LUNG: Clear to  auscultation bilaterally; No rales, rhonchi, wheezing, or rubs  bilaterally  HEART: Regular rate and rhythm; No murmurs, rubs, or gallops  ABDOMEN: Soft, Nontender, Nondistended; Bowel sounds present  EXTREMITIES:  2+ Peripheral Pulses, No clubbing, cyanosis, or edema BL LE  SKIN: No rashes or lesions  NERVOUS SYSTEM:  Alert & Oriented X3, Good concentration; Motor Strength 5/5 B/L upper and lower extremities;   DTRs 2+ intact and symmetric, sensation intact BL    LABS:                        12.8   18.93 )-----------( 103      ( 2018 06:22 )             38.7         136  |  103  |  16  ----------------------------<  114<H>  4.0   |  22  |  1.03    Ca    7.9<L>      2018 06:22  Phos  2.0       Mg     1.9         TPro  5.8<L>  /  Alb  2.2<L>  /  TBili  5.5<H>  /  DBili  x   /  AST  195<H>  /  ALT  138<H>  /  AlkPhos  208<H>      PT/INR - ( 2018 01:55 )   PT: 11.4 sec;   INR: 1.02 ratio         PTT - ( 2018 01:55 )  PTT:30.2 sec  Urinalysis Basic - ( 2018 05:16 )    Color: Yellow / Appearance: Clear / S.010 / pH: x  Gluc: x / Ketone: Negative  / Bili: Negative / Urobili: Negative mg/dL   Blood: x / Protein: Negative mg/dL / Nitrite: Negative   Leuk Esterase: Negative / RBC: x / WBC x   Sq Epi: x / Non Sq Epi: x / Bacteria: x      Cultures;   CAPILLARY BLOOD GLUCOSE      POCT Blood Glucose.: 101 mg/dL (2018 10:34)  POCT Blood Glucose.: 116 mg/dL (2018 07:28)  POCT Blood Glucose.: 130 mg/dL (2018 06:24)  POCT Blood Glucose.: 124 mg/dL (2018 19:25)    Lipid panel:     CARDIAC MARKERS ( 2018 01:55 )  <.015 ng/mL / x     / x     / x     / <1.0 ng/mL        RADIOLOGY & ADDITIONAL TESTS:  < from: US Abdomen Complete (18 @ 04:51) >  Cholelithiasis with thickened gallbladder wall. However, there is no   evidence for pericholecystic fluid or ultrasonic Pulliam's sign. Findings   are equivocal for acute cholecystitis. If clinically indicated, HIDA scan   may be pursued for further evaluation.    Dilated common bile duct    Trace ascites.    ADDENDUM    As per discussion with LUDMILA Diamond, the patient may have a previous   history of cholecystectomy. Stones noted in the presumed gallbladder in   the body of the report may be in the dilated cystic duct remanent   instead. Correlation with prior surgical report is recommended.    < from: CT Abdomen and Pelvis w/ Oral Cont and w/ IV Cont (18 @ 03:50) >  IMPRESSION: Cholecystectomy. Intrahepatic and extrahepatic ductal   dilation due to extensive choledocholithiasis in the right hepatic duct,   common bile duct and cystic duct remnant, measuring up to 14 mm. Biliary   enhancement concerning for cholangitis. ERCP recommended.        Imaging Personally Reviewed:  [ x] YES      Consultant(s) Notes Reviewed:  [x ] YES     Care Discussed with [x ] Consultants [X ] Patient [x ] Family  [x ]    [x ]  Other; RN Patient is a 72y old  Female who presents with a chief complaint of cholangitis (2018 18:08)      OVERNIGHT EVENTS: none      REVIEW OF SYSTEMS: denies chest pain/SOB, diaphoresis, no F/C, cough, dizziness, headache, blurry vision, nausea, vomiting, abdominal pain. Rest unremarkable     MEDICATIONS  (STANDING):  ciprofloxacin   IVPB 400 milliGRAM(s) IV Intermittent every 12 hours  heparin  Injectable 5000 Unit(s) SubCutaneous every 8 hours  metroNIDAZOLE  IVPB 500 milliGRAM(s) IV Intermittent every 8 hours  pantoprazole    Tablet 40 milliGRAM(s) Oral before breakfast  predniSONE   Tablet 5 milliGRAM(s) Oral every other day  sodium chloride 0.45%. 1000 milliLiter(s) (100 mL/Hr) IV Continuous <Continuous>    MEDICATIONS  (PRN):  ibuprofen  Tablet. 600 milliGRAM(s) Oral every 6 hours PRN Temp greater or equal to 38C (100.4F), Mild Pain (1 - 3)  morphine  - Injectable 1 milliGRAM(s) IV Push every 4 hours PRN Severe Pain (7 - 10)  ondansetron Injectable 8 milliGRAM(s) IV Push every 8 hours PRN Nausea and/or Vomiting      Allergies    Diovan (Joint Pain; Muscle Pain)  Enalapril Maleate (Muscle Pain)  erythromycin (Other; Vomiting; Nausea)  Evista (Swelling)  hydrochlorothiazide (Headache; Joint Pain)  Lozol (Other)  methotrexate (Other)  Miacalcin (Pruritus)  penicillin (Angioedema; Swelling; Hives)  Premarin (Other)    Intolerances        SUBJECTIVE: in bed in NAD, no acute events overnight     T(F): 99.4 (18 @ 11:04), Max: 101.4 (18 @ 05:53)  HR: 90 (18 @ 11:04) (90 - 109)  BP: 140/68 (18 @ 11:04) (128/66 - 140/68)  RR: 16 (18 @ 11:04) (16 - 18)  SpO2: 96% (18 @ 11:04) (94% - 96%)  Wt(kg): --    PHYSICAL EXAM:  GENERAL: NAD, well-groomed, well-developed appears chroncially ill and older than stated age .   HEAD:  Atraumatic, Normocephalic  EYES: EOMI, PERRLA, conjunctiva and sclera clear  ENMT: No tonsillar erythema, exudates, or enlargement; Moist mucous membranes, Good dentition, No lesions  NECK: Supple, No JVD, Normal thyroid  CHEST/LUNG: Clear to  auscultation bilaterally; No rales, rhonchi, wheezing, or rubs  bilaterally  HEART: Regular rate and rhythm; No murmurs, rubs, or gallops  ABDOMEN: Soft, mild RUQ tenderness +bs present  EXTREMITIES:  2+ Peripheral Pulses, No clubbing, cyanosis, or edema BL LE  SKIN: No rashes or lesions  NERVOUS SYSTEM:  Alert & Oriented X3, Good concentration; Motor Strength 5/5 B/L upper and lower extremities;   DTRs 2+ intact and symmetric, sensation intact BL    LABS:                        12.8   18.93 )-----------( 103      ( 2018 06:22 )             38.7         136  |  103  |  16  ----------------------------<  114<H>  4.0   |  22  |  1.03    Ca    7.9<L>      2018 06:22  Phos  2.0       Mg     1.9         TPro  5.8<L>  /  Alb  2.2<L>  /  TBili  5.5<H>  /  DBili  x   /  AST  195<H>  /  ALT  138<H>  /  AlkPhos  208<H>      PT/INR - ( 2018 01:55 )   PT: 11.4 sec;   INR: 1.02 ratio         PTT - ( 2018 01:55 )  PTT:30.2 sec  Urinalysis Basic - ( 2018 05:16 )    Color: Yellow / Appearance: Clear / S.010 / pH: x  Gluc: x / Ketone: Negative  / Bili: Negative / Urobili: Negative mg/dL   Blood: x / Protein: Negative mg/dL / Nitrite: Negative   Leuk Esterase: Negative / RBC: x / WBC x   Sq Epi: x / Non Sq Epi: x / Bacteria: x      Cultures;   CAPILLARY BLOOD GLUCOSE      POCT Blood Glucose.: 101 mg/dL (2018 10:34)  POCT Blood Glucose.: 116 mg/dL (2018 07:28)  POCT Blood Glucose.: 130 mg/dL (2018 06:24)  POCT Blood Glucose.: 124 mg/dL (2018 19:25)    Lipid panel:     CARDIAC MARKERS ( 2018 01:55 )  <.015 ng/mL / x     / x     / x     / <1.0 ng/mL        RADIOLOGY & ADDITIONAL TESTS:  < from: US Abdomen Complete (18 @ 04:51) >  Cholelithiasis with thickened gallbladder wall. However, there is no   evidence for pericholecystic fluid or ultrasonic Pulliam's sign. Findings   are equivocal for acute cholecystitis. If clinically indicated, HIDA scan   may be pursued for further evaluation.    Dilated common bile duct    Trace ascites.    ADDENDUM    As per discussion with LUDMILA Diamond, the patient may have a previous   history of cholecystectomy. Stones noted in the presumed gallbladder in   the body of the report may be in the dilated cystic duct remanent   instead. Correlation with prior surgical report is recommended.    < from: CT Abdomen and Pelvis w/ Oral Cont and w/ IV Cont (18 @ 03:50) >  IMPRESSION: Cholecystectomy. Intrahepatic and extrahepatic ductal   dilation due to extensive choledocholithiasis in the right hepatic duct,   common bile duct and cystic duct remnant, measuring up to 14 mm. Biliary   enhancement concerning for cholangitis. ERCP recommended.        Imaging Personally Reviewed:  [ x] YES      Consultant(s) Notes Reviewed:  [x ] YES     Care Discussed with [x ] Consultants [X ] Patient [x ] Family  [x ]    [x ]  Other; RN

## 2018-11-27 NOTE — PROGRESS NOTE ADULT - PROBLEM SELECTOR PLAN 1
Pt will need ERCP will consult Dr. Paris   Continue IVF  Zofran PRN for nausea, vomiting  Continue cipro and flagyl    Analgesia PRN Pt will need ERCP to be done by her own GI doctor Dr. Walls    Continue IVF  Zofran PRN for nausea, vomiting  Continue cipro and flagyl    Analgesia PRN Pt will need ERCP to be done by her own GI doctor Dr. Walls she is add on for today    Continue IVF  Zofran PRN for nausea, vomiting  Continue cipro and flagyl    Analgesia PRN

## 2018-11-27 NOTE — PROGRESS NOTE ADULT - ASSESSMENT
72 year old woman with PMH hiatal hernia, GERD, HTN, breast CA s/p L mastectomy 2011, psh cholecystectomy years ago and CBD obstruction s/p stent exchange x2 yrs ago here c/o n/v, epigastric pain.  She denies fever, chills, bloody stool.  Patient will require admission for at least 2 midnights as detailed below:

## 2018-11-28 DIAGNOSIS — D69.6 THROMBOCYTOPENIA, UNSPECIFIED: ICD-10-CM

## 2018-11-28 DIAGNOSIS — E83.39 OTHER DISORDERS OF PHOSPHORUS METABOLISM: ICD-10-CM

## 2018-11-28 DIAGNOSIS — E87.6 HYPOKALEMIA: ICD-10-CM

## 2018-11-28 DIAGNOSIS — E44.0 MODERATE PROTEIN-CALORIE MALNUTRITION: ICD-10-CM

## 2018-11-28 DIAGNOSIS — A41.9 SEPSIS, UNSPECIFIED ORGANISM: ICD-10-CM

## 2018-11-28 DIAGNOSIS — E87.1 HYPO-OSMOLALITY AND HYPONATREMIA: ICD-10-CM

## 2018-11-28 LAB
ALBUMIN SERPL ELPH-MCNC: 1.9 G/DL — LOW (ref 3.3–5)
ALP SERPL-CCNC: 190 U/L — HIGH (ref 40–120)
ALT FLD-CCNC: 87 U/L — HIGH (ref 12–78)
ANION GAP SERPL CALC-SCNC: 10 MMOL/L — SIGNIFICANT CHANGE UP (ref 5–17)
AST SERPL-CCNC: 80 U/L — HIGH (ref 15–37)
BILIRUB SERPL-MCNC: 4.7 MG/DL — HIGH (ref 0.2–1.2)
BUN SERPL-MCNC: 10 MG/DL — SIGNIFICANT CHANGE UP (ref 7–23)
CALCIUM SERPL-MCNC: 7.7 MG/DL — LOW (ref 8.5–10.1)
CHLORIDE SERPL-SCNC: 100 MMOL/L — SIGNIFICANT CHANGE UP (ref 96–108)
CO2 SERPL-SCNC: 22 MMOL/L — SIGNIFICANT CHANGE UP (ref 22–31)
CREAT SERPL-MCNC: 0.89 MG/DL — SIGNIFICANT CHANGE UP (ref 0.5–1.3)
GLUCOSE BLDC GLUCOMTR-MCNC: 100 MG/DL — HIGH (ref 70–99)
GLUCOSE BLDC GLUCOMTR-MCNC: 138 MG/DL — HIGH (ref 70–99)
GLUCOSE BLDC GLUCOMTR-MCNC: 83 MG/DL — SIGNIFICANT CHANGE UP (ref 70–99)
GLUCOSE SERPL-MCNC: 301 MG/DL — HIGH (ref 70–99)
HCT VFR BLD CALC: 38.9 % — SIGNIFICANT CHANGE UP (ref 34.5–45)
HGB BLD-MCNC: 12.5 G/DL — SIGNIFICANT CHANGE UP (ref 11.5–15.5)
MAGNESIUM SERPL-MCNC: 2.1 MG/DL — SIGNIFICANT CHANGE UP (ref 1.6–2.6)
MCHC RBC-ENTMCNC: 28.5 PG — SIGNIFICANT CHANGE UP (ref 27–34)
MCHC RBC-ENTMCNC: 32.1 GM/DL — SIGNIFICANT CHANGE UP (ref 32–36)
MCV RBC AUTO: 88.6 FL — SIGNIFICANT CHANGE UP (ref 80–100)
NRBC # BLD: 0 /100 WBCS — SIGNIFICANT CHANGE UP (ref 0–0)
PHOSPHATE SERPL-MCNC: 1.4 MG/DL — LOW (ref 2.5–4.5)
PLATELET # BLD AUTO: 87 K/UL — LOW (ref 150–400)
POTASSIUM SERPL-MCNC: 3.3 MMOL/L — LOW (ref 3.5–5.3)
POTASSIUM SERPL-SCNC: 3.3 MMOL/L — LOW (ref 3.5–5.3)
PROT SERPL-MCNC: 5.5 GM/DL — LOW (ref 6–8.3)
RBC # BLD: 4.39 M/UL — SIGNIFICANT CHANGE UP (ref 3.8–5.2)
RBC # FLD: 13.5 % — SIGNIFICANT CHANGE UP (ref 10.3–14.5)
SODIUM SERPL-SCNC: 132 MMOL/L — LOW (ref 135–145)
WBC # BLD: 13.38 K/UL — HIGH (ref 3.8–10.5)
WBC # FLD AUTO: 13.38 K/UL — HIGH (ref 3.8–10.5)

## 2018-11-28 PROCEDURE — 71045 X-RAY EXAM CHEST 1 VIEW: CPT | Mod: 26

## 2018-11-28 PROCEDURE — 99233 SBSQ HOSP IP/OBS HIGH 50: CPT

## 2018-11-28 RX ORDER — SODIUM CHLORIDE 9 MG/ML
1000 INJECTION INTRAMUSCULAR; INTRAVENOUS; SUBCUTANEOUS
Qty: 0 | Refills: 0 | Status: COMPLETED | OUTPATIENT
Start: 2018-11-28 | End: 2018-11-28

## 2018-11-28 RX ORDER — SODIUM CHLORIDE 9 MG/ML
1000 INJECTION, SOLUTION INTRAVENOUS
Qty: 0 | Refills: 0 | Status: DISCONTINUED | OUTPATIENT
Start: 2018-11-28 | End: 2018-11-28

## 2018-11-28 RX ORDER — POTASSIUM PHOSPHATE, MONOBASIC POTASSIUM PHOSPHATE, DIBASIC 236; 224 MG/ML; MG/ML
15 INJECTION, SOLUTION INTRAVENOUS ONCE
Qty: 0 | Refills: 0 | Status: COMPLETED | OUTPATIENT
Start: 2018-11-28 | End: 2018-11-29

## 2018-11-28 RX ORDER — AMLODIPINE BESYLATE 2.5 MG/1
10 TABLET ORAL DAILY
Qty: 0 | Refills: 0 | Status: DISCONTINUED | OUTPATIENT
Start: 2018-11-28 | End: 2018-11-29

## 2018-11-28 RX ORDER — ONDANSETRON 8 MG/1
4 TABLET, FILM COATED ORAL ONCE
Qty: 0 | Refills: 0 | Status: COMPLETED | OUTPATIENT
Start: 2018-11-28 | End: 2018-11-28

## 2018-11-28 RX ORDER — POTASSIUM CHLORIDE 20 MEQ
10 PACKET (EA) ORAL
Qty: 0 | Refills: 0 | Status: COMPLETED | OUTPATIENT
Start: 2018-11-28 | End: 2018-11-28

## 2018-11-28 RX ADMIN — SODIUM CHLORIDE 50 MILLILITER(S): 9 INJECTION INTRAMUSCULAR; INTRAVENOUS; SUBCUTANEOUS at 22:08

## 2018-11-28 RX ADMIN — Medication 5 MILLIGRAM(S): at 12:58

## 2018-11-28 RX ADMIN — Medication 100 MILLIGRAM(S): at 05:55

## 2018-11-28 RX ADMIN — Medication 1 APPLICATION(S): at 18:34

## 2018-11-28 RX ADMIN — AMLODIPINE BESYLATE 10 MILLIGRAM(S): 2.5 TABLET ORAL at 18:37

## 2018-11-28 RX ADMIN — HEPARIN SODIUM 5000 UNIT(S): 5000 INJECTION INTRAVENOUS; SUBCUTANEOUS at 05:55

## 2018-11-28 RX ADMIN — Medication 100 MILLIGRAM(S): at 22:08

## 2018-11-28 RX ADMIN — Medication 100 MILLIEQUIVALENT(S): at 18:49

## 2018-11-28 RX ADMIN — SODIUM CHLORIDE 100 MILLILITER(S): 9 INJECTION, SOLUTION INTRAVENOUS at 01:28

## 2018-11-28 RX ADMIN — HEPARIN SODIUM 5000 UNIT(S): 5000 INJECTION INTRAVENOUS; SUBCUTANEOUS at 13:08

## 2018-11-28 RX ADMIN — Medication 100 MILLIEQUIVALENT(S): at 20:25

## 2018-11-28 RX ADMIN — Medication 200 MILLIGRAM(S): at 04:08

## 2018-11-28 RX ADMIN — Medication 100 MILLIEQUIVALENT(S): at 22:08

## 2018-11-28 RX ADMIN — SODIUM CHLORIDE 100 MILLILITER(S): 9 INJECTION, SOLUTION INTRAVENOUS at 00:48

## 2018-11-28 RX ADMIN — ONDANSETRON 4 MILLIGRAM(S): 8 TABLET, FILM COATED ORAL at 11:39

## 2018-11-28 RX ADMIN — Medication 100 MILLIGRAM(S): at 13:08

## 2018-11-28 RX ADMIN — Medication 200 MILLIGRAM(S): at 17:10

## 2018-11-28 RX ADMIN — Medication 1 APPLICATION(S): at 05:55

## 2018-11-28 NOTE — PROGRESS NOTE ADULT - PROBLEM SELECTOR PLAN 9
POA .. and this accounts for elevated lactate levels. will repeat in am    continue with antibx   wbc downtrending

## 2018-11-28 NOTE — PROGRESS NOTE ADULT - SUBJECTIVE AND OBJECTIVE BOX
Patient is a 72y old  Female who presents with a chief complaint of cholangitis (26 Nov 2018 18:08)      OVERNIGHT EVENTS: none    MEDICATIONS  (STANDING):  ammonium lactate 12% Lotion 1 Application(s) Topical two times a day  ciprofloxacin   IVPB 400 milliGRAM(s) IV Intermittent every 12 hours  heparin  Injectable 5000 Unit(s) SubCutaneous every 8 hours  metroNIDAZOLE  IVPB 500 milliGRAM(s) IV Intermittent every 8 hours  pantoprazole    Tablet 40 milliGRAM(s) Oral before breakfast  potassium chloride  10 mEq/100 mL IVPB 10 milliEquivalent(s) IV Intermittent every 1 hour  potassium phosphate IVPB 15 milliMole(s) IV Intermittent once  predniSONE   Tablet 5 milliGRAM(s) Oral every other day  sodium chloride 0.9%. 1000 milliLiter(s) (50 mL/Hr) IV Continuous <Continuous>    MEDICATIONS  (PRN):  ibuprofen  Tablet. 600 milliGRAM(s) Oral every 6 hours PRN Temp greater or equal to 38C (100.4F), Mild Pain (1 - 3)  morphine  - Injectable 1 milliGRAM(s) IV Push every 4 hours PRN Severe Pain (7 - 10)  ondansetron Injectable 8 milliGRAM(s) IV Push every 8 hours PRN Nausea and/or Vomiting    Allergies    Diovan (Joint Pain; Muscle Pain)  Enalapril Maleate (Muscle Pain)  erythromycin (Other; Vomiting; Nausea)  Evista (Swelling)  hydrochlorothiazide (Headache; Joint Pain)  Lozol (Other)  methotrexate (Other)  Miacalcin (Pruritus)  penicillin (Angioedema; Swelling; Hives)  Premarin (Other)    Intolerances        SUBJECTIVE: in bed in NAD, no acute events overnight     Vital Signs Last 24 Hrs  T(C): 37 (28 Nov 2018 16:39), Max: 37 (28 Nov 2018 05:02)  T(F): 98.6 (28 Nov 2018 16:39), Max: 98.6 (28 Nov 2018 05:02)  HR: 76 (28 Nov 2018 16:39) (73 - 90)  BP: 182/96 (28 Nov 2018 16:39) (121/82 - 182/96)  BP(mean): --  RR: 20 (28 Nov 2018 16:39) (16 - 22)  SpO2: 96% (28 Nov 2018 16:39) (96% - 100%)    PHYSICAL EXAM:  GENERAL: NAD, well-groomed, well-developed appears chroncially ill and older than stated age .   HEAD:  Atraumatic, Normocephalic  EYES: EOMI, PERRLA, conjunctiva and sclera clear  ENMT: No tonsillar erythema, exudates, or enlargement; Moist mucous membranes, Good dentition, No lesions  NECK: Supple, No JVD, Normal thyroid  CHEST/LUNG: Clear to  auscultation bilaterally; No rales, rhonchi, wheezing, or rubs  bilaterally  HEART: Regular rate and rhythm; No murmurs, rubs, or gallops  ABDOMEN: Soft, mild RUQ tenderness +bs present  EXTREMITIES:  2+ Peripheral Pulses, No clubbing, cyanosis, or edema BL LE  SKIN: No rashes or lesions  NERVOUS SYSTEM:  Alert & Oriented X3, Good concentration; Motor Strength 5/5 B/L upper and lower extremities;   DTRs 2+ intact and symmetric, sensation intact BL    LABS:                                     12.5   13.38 )-----------( 87       ( 28 Nov 2018 06:21 )             38.9   11-28    132<L>  |  100  |  10  ----------------------------<  301<H>  3.3<L>   |  22  |  0.89    Ca    7.7<L>      28 Nov 2018 06:21  Phos  1.4     11-28  Mg     2.1     11-28    TPro  5.5<L>  /  Alb  1.9<L>  /  TBili  4.7<H>  /  DBili  x   /  AST  80<H>  /  ALT  87<H>  /  AlkPhos  190<H>  11-28      Culture - Urine (collected 26 Nov 2018 08:31)  Source: .Urine Clean Catch (Midstream)  Final Report (27 Nov 2018 10:30):    <10,000 CFU/ml Normal Urogenital briseida present      Cultures;   CAPILLARY BLOOD GLUCOSE    CAPILLARY BLOOD GLUCOSE      POCT Blood Glucose.: 100 mg/dL (28 Nov 2018 07:16)  POCT Blood Glucose.: 83 mg/dL (28 Nov 2018 00:45)    Lipid panel:     CARDIAC MARKERS ( 26 Nov 2018 01:55 )  <.015 ng/mL / x     / x     / x     / <1.0 ng/mL        RADIOLOGY & ADDITIONAL TESTS:  < from: US Abdomen Complete (11.26.18 @ 04:51) >  Cholelithiasis with thickened gallbladder wall. However, there is no   evidence for pericholecystic fluid or ultrasonic Pulliam's sign. Findings   are equivocal for acute cholecystitis. If clinically indicated, HIDA scan   may be pursued for further evaluation.    Dilated common bile duct    Trace ascites.    ADDENDUM    As per discussion with LUDMILA Diamond, the patient may have a previous   history of cholecystectomy. Stones noted in the presumed gallbladder in   the body of the report may be in the dilated cystic duct remanent   instead. Correlation with prior surgical report is recommended.    < from: CT Abdomen and Pelvis w/ Oral Cont and w/ IV Cont (11.26.18 @ 03:50) >  IMPRESSION: Cholecystectomy. Intrahepatic and extrahepatic ductal   dilation due to extensive choledocholithiasis in the right hepatic duct,   common bile duct and cystic duct remnant, measuring up to 14 mm. Biliary   enhancement concerning for cholangitis. ERCP recommended.        Imaging Personally Reviewed:  [ x] YES      Consultant(s) Notes Reviewed:  [x ] YES     Care Discussed with [x ] Consultants [X ] Patient [x ] Family  [x ]    [x ]  Other; RN

## 2018-11-28 NOTE — DIETITIAN INITIAL EVALUATION ADULT. - PERTINENT LABORATORY DATA
11-28 Na132 mmol/L<L> Glu 301 mg/dL<H> K+ 3.3 mmol/L<L> Cr  0.89 mg/dL BUN 10 mg/dL 11-28 Phos 1.4 mg/dL<L> 11-28 Alb 1.9 g/dL<L>11-28 ALT 87 U/L<H> AST 80 U/L<H> Alkaline Phosphatase 190 U/L<H> 11-28 Hgb 12.5 g/dL Hct 38.9 %

## 2018-11-28 NOTE — CHART NOTE - NSCHARTNOTEFT_GEN_A_CORE
Upon Nutritional Assessment by the Registered Dietitian your patient was determined to meet criteria / has evidence of the following diagnosis/diagnoses:          [ ]  Mild Protein Calorie Malnutrition        [x ]  Moderate Protein Calorie Malnutrition        [ ] Severe Protein Calorie Malnutrition        [ ] Unspecified Protein Calorie Malnutrition        [ ] Underweight / BMI <19        [ ] Morbid Obesity / BMI > 40      Findings as based on:  •  Comprehensive nutrition assessment and consultation  •  Calorie counts (nutrient intake analysis)  •  Food acceptance and intake status from observations by staff  •  Follow up  •  Patient education  •  Intervention secondary to interdisciplinary rounds  •   concerns      Treatment:    The following diet has been recommended:  advance diet when appropriate to clear fluid to low fat, low sodium, Dysphagia 3 Soft - Thin Liquids c Ensure Clear(berry flavor) 8 oz 2x/day (480 breanne, 16 gm pro)       PROVIDER Section:     By signing this assessment you are acknowledging and agree with the diagnosis/diagnoses assigned by the Registered Dietitian    Comments:

## 2018-11-28 NOTE — DIETITIAN INITIAL EVALUATION ADULT. - ETIOLOGY
inadequate protein-energy intake in setting of hx of breast cancer, altered chew, GERD, CBD obstruction

## 2018-11-28 NOTE — DIETITIAN INITIAL EVALUATION ADULT. - PERTINENT MEDS FT
MEDICATIONS  (STANDING):  ammonium lactate 12% Lotion 1 Application(s) Topical two times a day  ciprofloxacin   IVPB 400 milliGRAM(s) IV Intermittent every 12 hours  heparin  Injectable 5000 Unit(s) SubCutaneous every 8 hours  lactated ringers. 1000 milliLiter(s) (125 mL/Hr) IV Continuous <Continuous>  metroNIDAZOLE  IVPB 500 milliGRAM(s) IV Intermittent every 8 hours  pantoprazole    Tablet 40 milliGRAM(s) Oral before breakfast  predniSONE   Tablet 5 milliGRAM(s) Oral every other day    MEDICATIONS  (PRN):  ibuprofen  Tablet. 600 milliGRAM(s) Oral every 6 hours PRN Temp greater or equal to 38C (100.4F), Mild Pain (1 - 3)  morphine  - Injectable 1 milliGRAM(s) IV Push every 4 hours PRN Severe Pain (7 - 10)  ondansetron Injectable 8 milliGRAM(s) IV Push every 8 hours PRN Nausea and/or Vomiting  ondansetron Injectable 4 milliGRAM(s) IV Push once PRN Nausea and/or Vomiting

## 2018-11-28 NOTE — PROGRESS NOTE ADULT - PROBLEM SELECTOR PLAN 1
s/p ercp on 11/28/18 case d/w gi and had sludge pus and stent placed . will need f/u in 2-3 weeks with GI for removal .   reviewed admission labs and no blood cultures were done I will obtain . continue with antibx   wbc is improving         Continue IVF  Zofran PRN for nausea, vomiting  Continue cipro and flagyl    Analgesia PRN

## 2018-11-28 NOTE — DIETITIAN INITIAL EVALUATION ADULT. - OTHER INFO
Pt seen due to RN consult for significant decrease in oral intake > 5 days PTA.  Pt reports depressed intake 1 day PTA.  Pt reports many food Food allergies/intolerances.  Pt at present reports allergy/intolerance to corn, tomato, apple, oranges, shannon greens &  Ensure.  Pt s/p ERCP today, pt has been NPO.  Pt was taking  vitamin C, vitamin D, calcium supplement and aloe vera PTA.  Pt made aware to discuss all over the counter meds c MD.

## 2018-11-28 NOTE — CHART NOTE - NSCHARTNOTEFT_GEN_A_CORE
finger stick 82   pt is npo for procedure   will change fluids from 1/2 nl to d5 1/2 nl continue rate of 100 finger stick 82   pt is npo for procedure   will change fluids from 1/2 nl to d5 1/2 nl continue rate of 100  continue to monitor blood sugar

## 2018-11-28 NOTE — DIETITIAN INITIAL EVALUATION ADULT. - ENERGY NEEDS
Height (cm): 157.48 (11-26)  Weight (kg): 63.7 (11-26)  BMI (kg/m2): 25.7 (11-26)  IBW: 49.8 kg        % IBW: 127%            UBW:              %UBW  wt. loss of 2.6 kg since adm noted Height (cm): 157.48 (11-26)  Weight (kg): 63.7 (11-26)  BMI (kg/m2): 25.7 (11-26)  IBW: 49.8 kg        % IBW: 127%            UBW: 80.7 kg              %UBW: 78%  wt. loss of 2.6 kg since adm noted, 1+ edema of feet noted

## 2018-11-28 NOTE — PROGRESS NOTE ADULT - PROBLEM SELECTOR PLAN 10
etiology unclear if due to liver dysfunction which is [id number]  vs sepsis.    will stop heparin check  HIT and and  monitor platelets.

## 2018-11-28 NOTE — DIETITIAN INITIAL EVALUATION ADULT. - NS AS NUTRI INTERV MEALS SNACK
Texture-modified diet/Mineral - modified diet/Fluid - modified diet/Recommend advance diet as tolerated to clear fluid to low fat, low sodium, Dysphagia 3 Soft - Thin Liquids

## 2018-11-28 NOTE — DIETITIAN INITIAL EVALUATION ADULT. - PHYSICAL APPEARANCE
BMI=25.7(11/26-for wt. of 63.7 kg)/other (specify) other (specify)/BMI=25.7(11/26-for wt. of 63.7 kg), Nutrition focused physical exam conducted; Subcutaneous fat Exam;  [ moderate  ]  Orbital fat pads region,  [ moderate ]Buccal fat region,  [ moderate ]triceps region, [WNL  ]ribs region.  Muscle Exam; [  moderate]temples region, [moderate   ]clavicle region, [ moderate ]shoulder region, [mild  ]Scapula region, [mild  ]Interosseous region, [ WNL ]thigh region, [ unable ]Calf region

## 2018-11-28 NOTE — PROGRESS NOTE ADULT - PROBLEM SELECTOR PLAN 2
Diet controlled BP is elevated and patient w/o pain  will start ACEI   Lisinopril 10mg po daily BP is elevated and patient w/o pain  will start norvasc

## 2018-11-28 NOTE — DIETITIAN INITIAL EVALUATION ADULT. - FACTORS AFF FOOD INTAKE
other (specify)/difficulty chewing/due to dental problems due to autoimmune disease after taking Hydrochlorthiazide c percocet 7-8 years ago, eats soft / ground foods as tolerated, abdominal pain, N/V on Sunday 11/25

## 2018-11-29 LAB
ALBUMIN SERPL ELPH-MCNC: 1.9 G/DL — LOW (ref 3.3–5)
ALP SERPL-CCNC: 190 U/L — HIGH (ref 40–120)
ALT FLD-CCNC: 68 U/L — SIGNIFICANT CHANGE UP (ref 12–78)
ANION GAP SERPL CALC-SCNC: 8 MMOL/L — SIGNIFICANT CHANGE UP (ref 5–17)
AST SERPL-CCNC: 51 U/L — HIGH (ref 15–37)
BILIRUB DIRECT SERPL-MCNC: 1.86 MG/DL — HIGH (ref 0.05–0.2)
BILIRUB INDIRECT FLD-MCNC: 0.5 MG/DL — SIGNIFICANT CHANGE UP (ref 0.2–1)
BILIRUB SERPL-MCNC: 2.4 MG/DL — HIGH (ref 0.2–1.2)
BUN SERPL-MCNC: 12 MG/DL — SIGNIFICANT CHANGE UP (ref 7–23)
CALCIUM SERPL-MCNC: 8 MG/DL — LOW (ref 8.5–10.1)
CHLORIDE SERPL-SCNC: 108 MMOL/L — SIGNIFICANT CHANGE UP (ref 96–108)
CO2 SERPL-SCNC: 24 MMOL/L — SIGNIFICANT CHANGE UP (ref 22–31)
CREAT SERPL-MCNC: 0.81 MG/DL — SIGNIFICANT CHANGE UP (ref 0.5–1.3)
GLUCOSE BLDC GLUCOMTR-MCNC: 117 MG/DL — HIGH (ref 70–99)
GLUCOSE BLDC GLUCOMTR-MCNC: 117 MG/DL — HIGH (ref 70–99)
GLUCOSE BLDC GLUCOMTR-MCNC: 189 MG/DL — HIGH (ref 70–99)
GLUCOSE BLDC GLUCOMTR-MCNC: 201 MG/DL — HIGH (ref 70–99)
GLUCOSE SERPL-MCNC: 217 MG/DL — HIGH (ref 70–99)
HCT VFR BLD CALC: 39.1 % — SIGNIFICANT CHANGE UP (ref 34.5–45)
HGB BLD-MCNC: 12.6 G/DL — SIGNIFICANT CHANGE UP (ref 11.5–15.5)
LACTATE SERPL-SCNC: 1.7 MMOL/L — SIGNIFICANT CHANGE UP (ref 0.7–2)
MAGNESIUM SERPL-MCNC: 2.2 MG/DL — SIGNIFICANT CHANGE UP (ref 1.6–2.6)
MCHC RBC-ENTMCNC: 28.1 PG — SIGNIFICANT CHANGE UP (ref 27–34)
MCHC RBC-ENTMCNC: 32.2 GM/DL — SIGNIFICANT CHANGE UP (ref 32–36)
MCV RBC AUTO: 87.3 FL — SIGNIFICANT CHANGE UP (ref 80–100)
NRBC # BLD: 0 /100 WBCS — SIGNIFICANT CHANGE UP (ref 0–0)
PF4 HEPARIN CMPLX AB SER-ACNC: NEGATIVE — SIGNIFICANT CHANGE UP
PF4 HEPARIN CMPLX AB SERPL QL IA: 0.14 ABS — SIGNIFICANT CHANGE UP
PHOSPHATE SERPL-MCNC: 2.5 MG/DL — SIGNIFICANT CHANGE UP (ref 2.5–4.5)
PLATELET # BLD AUTO: 107 K/UL — LOW (ref 150–400)
POTASSIUM SERPL-MCNC: 4.2 MMOL/L — SIGNIFICANT CHANGE UP (ref 3.5–5.3)
POTASSIUM SERPL-SCNC: 4.2 MMOL/L — SIGNIFICANT CHANGE UP (ref 3.5–5.3)
PROT SERPL-MCNC: 5.8 GM/DL — LOW (ref 6–8.3)
RBC # BLD: 4.48 M/UL — SIGNIFICANT CHANGE UP (ref 3.8–5.2)
RBC # FLD: 13.5 % — SIGNIFICANT CHANGE UP (ref 10.3–14.5)
SODIUM SERPL-SCNC: 140 MMOL/L — SIGNIFICANT CHANGE UP (ref 135–145)
WBC # BLD: 13.38 K/UL — HIGH (ref 3.8–10.5)
WBC # FLD AUTO: 13.38 K/UL — HIGH (ref 3.8–10.5)

## 2018-11-29 PROCEDURE — 99233 SBSQ HOSP IP/OBS HIGH 50: CPT

## 2018-11-29 RX ORDER — URSODIOL 250 MG/1
300 TABLET, FILM COATED ORAL EVERY 12 HOURS
Qty: 0 | Refills: 0 | Status: DISCONTINUED | OUTPATIENT
Start: 2018-11-29 | End: 2018-11-30

## 2018-11-29 RX ORDER — ATENOLOL 25 MG/1
25 TABLET ORAL DAILY
Qty: 0 | Refills: 0 | Status: DISCONTINUED | OUTPATIENT
Start: 2018-11-29 | End: 2018-11-30

## 2018-11-29 RX ORDER — PREGABALIN 225 MG/1
1000 CAPSULE ORAL DAILY
Qty: 0 | Refills: 0 | Status: DISCONTINUED | OUTPATIENT
Start: 2018-11-29 | End: 2018-11-30

## 2018-11-29 RX ADMIN — Medication 200 MILLIGRAM(S): at 05:07

## 2018-11-29 RX ADMIN — PREGABALIN 1000 MICROGRAM(S): 225 CAPSULE ORAL at 17:03

## 2018-11-29 RX ADMIN — Medication 100 MILLIGRAM(S): at 13:27

## 2018-11-29 RX ADMIN — Medication 1 TABLET(S): at 17:55

## 2018-11-29 RX ADMIN — Medication 200 MILLIGRAM(S): at 17:03

## 2018-11-29 RX ADMIN — AMLODIPINE BESYLATE 10 MILLIGRAM(S): 2.5 TABLET ORAL at 05:08

## 2018-11-29 RX ADMIN — Medication 1 APPLICATION(S): at 17:03

## 2018-11-29 RX ADMIN — Medication 1 APPLICATION(S): at 05:08

## 2018-11-29 RX ADMIN — ATENOLOL 25 MILLIGRAM(S): 25 TABLET ORAL at 17:03

## 2018-11-29 RX ADMIN — Medication 100 MILLIGRAM(S): at 05:07

## 2018-11-29 RX ADMIN — POTASSIUM PHOSPHATE, MONOBASIC POTASSIUM PHOSPHATE, DIBASIC 63.75 MILLIMOLE(S): 236; 224 INJECTION, SOLUTION INTRAVENOUS at 00:24

## 2018-11-29 RX ADMIN — PANTOPRAZOLE SODIUM 40 MILLIGRAM(S): 20 TABLET, DELAYED RELEASE ORAL at 07:27

## 2018-11-29 RX ADMIN — Medication 100 MILLIGRAM(S): at 22:34

## 2018-11-29 NOTE — PHYSICAL THERAPY INITIAL EVALUATION ADULT - ADDITIONAL COMMENTS
Pt has 3 steps c orin rails,  far apart and unable to be reached simultaneously, to get into house and no need for stairs negotiation at home. Pt has a straight cane and a quad cane.

## 2018-11-29 NOTE — PROGRESS NOTE ADULT - SUBJECTIVE AND OBJECTIVE BOX
HPI:  72 year old woman with PMH hiatal hernia, GERD, HTN, breast CA s/p L mastectomy 2011, psh cholecystectomy years ago and CBD obstruction s/p stent exchange x2 yrs ago here c/o n/v, epigastric pain.  She denies fever, chills, bloody stool.    In the ED, CT ab/plv shows biliary enhancement concerning for cholangitis, several bile duct stones. (26 Nov 2018 05:10)Pt had ERCP with dilation, stone extraction and stent placement. No new complaints. Will start Actigall      REVIEW OF SYSTEMS unremarkable      General:	    Skin/Breast:  	  Ophthalmologic:  	  ENMT:	    Respiratory and Thorax:  	  Cardiovascular:	    Gastrointestinal:	    Genitourinary:	    Musculoskeletal:	    Neurological:	    Psychiatric:	    Hematology/Lymphatics:	    Endocrine:	    Allergic/Immunologic:	    MEDICATIONS  (STANDING):  ammonium lactate 12% Lotion 1 Application(s) Topical two times a day  ATENolol  Tablet 25 milliGRAM(s) Oral daily  calcium carbonate 1250 mG  + Vitamin D (OsCal 500 + D) 1 Tablet(s) Oral daily  ciprofloxacin   IVPB 400 milliGRAM(s) IV Intermittent every 12 hours  cyanocobalamin 1000 MICROGram(s) Oral daily  metroNIDAZOLE  IVPB 500 milliGRAM(s) IV Intermittent every 8 hours  pantoprazole    Tablet 40 milliGRAM(s) Oral before breakfast  predniSONE   Tablet 5 milliGRAM(s) Oral every other day    MEDICATIONS  (PRN):  ibuprofen  Tablet. 600 milliGRAM(s) Oral every 6 hours PRN Temp greater or equal to 38C (100.4F), Mild Pain (1 - 3)  morphine  - Injectable 1 milliGRAM(s) IV Push every 4 hours PRN Severe Pain (7 - 10)  ondansetron Injectable 8 milliGRAM(s) IV Push every 8 hours PRN Nausea and/or Vomiting      Vital Signs Last 24 Hrs  T(C): 36.4 (29 Nov 2018 17:48), Max: 36.7 (29 Nov 2018 05:03)  T(F): 97.5 (29 Nov 2018 17:48), Max: 98 (29 Nov 2018 05:03)  HR: 68 (29 Nov 2018 17:48) (68 - 86)  BP: 142/72 (29 Nov 2018 17:48) (120/66 - 142/72)  BP(mean): --  RR: 18 (29 Nov 2018 17:48) (16 - 18)  SpO2: 97% (29 Nov 2018 17:48) (96% - 98%)    PHYSICAL EXAM:      Constitutional:    Eyes: no jaundice    ENMT:    Neck: supple    Breasts:    Back:    Respiratory: normal    Cardiovascular: normal    Gastrointestinal: normal    Genitourinary:    Rectal:    Extremities:    Vascular:    Neurological:    Skin:    Lymph Nodes:    Musculoskeletal:    Psychiatric:            HEALTH ISSUES - PROBLEM Dx:  Thrombocytopenia: Thrombocytopenia  Sepsis, due to unspecified organism: Sepsis, due to unspecified organism  Hyponatremia: Hyponatremia  Hypophosphatemia: Hypophosphatemia  Moderate protein-calorie malnutrition: Moderate protein-calorie malnutrition  Hypokalemia: Hypokalemia  Dry skin: Dry skin  Preventive measure: Preventive measure  Arthritis: Arthritis  Essential hypertension: Essential hypertension  Cholangitis: Cholangitis            Assesment & Plan: Resolving cholangitis. D/c planning

## 2018-11-29 NOTE — PHYSICAL THERAPY INITIAL EVALUATION ADULT - SPECIFY REASON(S)
Pt is at her baseline performance.Pt is I c good balance and endurance c a straight cane in all functional ability, therefore d/c pt from the PT program.  If any further needs arise, please reconsult.

## 2018-11-29 NOTE — PROGRESS NOTE ADULT - SUBJECTIVE AND OBJECTIVE BOX
Patient is a 72y old  Female who presents with a chief complaint of cholangitis (26 Nov 2018 18:08)      OVERNIGHT EVENTS: none    MEDICATIONS  (STANDING):  ammonium lactate 12% Lotion 1 Application(s) Topical two times a day  ATENolol  Tablet 25 milliGRAM(s) Oral daily  ciprofloxacin   IVPB 400 milliGRAM(s) IV Intermittent every 12 hours  cyanocobalamin 1000 MICROGram(s) Oral daily  metroNIDAZOLE  IVPB 500 milliGRAM(s) IV Intermittent every 8 hours  pantoprazole    Tablet 40 milliGRAM(s) Oral before breakfast  predniSONE   Tablet 5 milliGRAM(s) Oral every other day    MEDICATIONS  (PRN):  ibuprofen  Tablet. 600 milliGRAM(s) Oral every 6 hours PRN Temp greater or equal to 38C (100.4F), Mild Pain (1 - 3)  morphine  - Injectable 1 milliGRAM(s) IV Push every 4 hours PRN Severe Pain (7 - 10)  ondansetron Injectable 8 milliGRAM(s) IV Push every 8 hours PRN Nausea and/or Vomiting    Allergies    Diovan (Joint Pain; Muscle Pain)  Enalapril Maleate (Muscle Pain)  erythromycin (Other; Vomiting; Nausea)  Evista (Swelling)  hydrochlorothiazide (Headache; Joint Pain)  Lozol (Other)  methotrexate (Other)  Miacalcin (Pruritus)  penicillin (Angioedema; Swelling; Hives)  Premarin (Other)    Intolerances        SUBJECTIVE: in bed in NAD, no acute events overnight     Vital Signs Last 24 Hrs  T(C): 36.6 (29 Nov 2018 11:16), Max: 37 (28 Nov 2018 15:53)  T(F): 97.9 (29 Nov 2018 11:16), Max: 98.6 (28 Nov 2018 15:53)  HR: 75 (29 Nov 2018 12:12) (75 - 86)  BP: 137/72 (29 Nov 2018 12:12) (120/66 - 182/96)  BP(mean): --  RR: 16 (29 Nov 2018 12:12) (16 - 20)  SpO2: 98% (29 Nov 2018 12:12) (96% - 98%)    PHYSICAL EXAM:  GENERAL: NAD, well-groomed, well-developed appears chronically ill and older than stated age .   HEAD:  Atraumatic, Normocephalic  EYES: EOMI, PERRLA, conjunctiva and sclera clear  ENMT: No tonsillar erythema, exudates, or enlargement; Moist mucous membranes, Good dentition, No lesions  NECK: Supple, No JVD, Normal thyroid  CHEST/LUNG: Clear to  auscultation bilaterally; No rales, rhonchi, wheezing, or rubs  bilaterally  HEART: Regular rate and rhythm; No murmurs, rubs, or gallops  ABDOMEN: Soft, NT ND +bs present  EXTREMITIES:  2+ Peripheral Pulses, No clubbing, cyanosis, or edema BL LE  SKIN: No rashes or lesions  NERVOUS SYSTEM:  Alert & Oriented X3, Good concentration; Motor Strength 5/5 B/L upper and lower extremities;   DTRs 2+ intact and symmetric, sensation intact BL    LABS:                                                     12.6   13.38 )-----------( 107      ( 29 Nov 2018 06:16 )             39.1   11-29    140  |  108  |  12  ----------------------------<  217<H>  4.2   |  24  |  0.81    Ca    8.0<L>      29 Nov 2018 06:16  Phos  2.5     11-29  Mg     2.2     11-29    TPro  5.5<L>  /  Alb  1.9<L>  /  TBili  4.7<H>  /  DBili  x   /  AST  80<H>  /  ALT  87<H>  /  AlkPhos  190<H>  11-28      Culture - Urine (collected 26 Nov 2018 08:31)  Source: .Urine Clean Catch (Midstream)  Final Report (27 Nov 2018 10:30):    <10,000 CFU/ml Normal Urogenital briseida present      Cultures;   CAPILLARY BLOOD GLUCOSE          Lipid panel:     CARDIAC MARKERS ( 26 Nov 2018 01:55 )  <.015 ng/mL / x     / x     / x     / <1.0 ng/mL        RADIOLOGY & ADDITIONAL TESTS:  < from: US Abdomen Complete (11.26.18 @ 04:51) >  Cholelithiasis with thickened gallbladder wall. However, there is no   evidence for pericholecystic fluid or ultrasonic Pulliam's sign. Findings   are equivocal for acute cholecystitis. If clinically indicated, HIDA scan   may be pursued for further evaluation.    Dilated common bile duct    Trace ascites.    ADDENDUM    As per discussion with LUDMILA Diamond, the patient may have a previous   history of cholecystectomy. Stones noted in the presumed gallbladder in   the body of the report may be in the dilated cystic duct remanent   instead. Correlation with prior surgical report is recommended.    < from: CT Abdomen and Pelvis w/ Oral Cont and w/ IV Cont (11.26.18 @ 03:50) >  IMPRESSION: Cholecystectomy. Intrahepatic and extrahepatic ductal   dilation due to extensive choledocholithiasis in the right hepatic duct,   common bile duct and cystic duct remnant, measuring up to 14 mm. Biliary   enhancement concerning for cholangitis. ERCP recommended.        Imaging Personally Reviewed:  [ x] YES      Consultant(s) Notes Reviewed:  [x ] YES     Care Discussed with [x ] Consultants [X ] Patient [x ] Family  [x ]    [x ]  Other; RN

## 2018-11-29 NOTE — PHYSICAL THERAPY INITIAL EVALUATION ADULT - GENERAL OBSERVATIONS, REHAB EVAL
Pt was seen in supine c HOB at 30 degrees elevation, alert and Ox4. Son at bedside. Pt was comfortable and motivated.

## 2018-11-29 NOTE — PHYSICAL THERAPY INITIAL EVALUATION ADULT - PREDICTED DURATION OF THERAPY (DAYS/WKS), PT EVAL
Pt is at her baseline performance.  Pt is I c good balance and endurance c a straight cane in all functional ability, therefore d/c pt from the PT program.  If any further needs arise, please reconsult.

## 2018-11-29 NOTE — PROGRESS NOTE ADULT - PROBLEM SELECTOR PLAN 10
etiology unclear if due to liver dysfunction which is improving  vs sepsis.    nevertheless is improving    stopped heparin   checking  HIT and   monitor platelets.

## 2018-11-29 NOTE — PROGRESS NOTE ADULT - PROBLEM SELECTOR PLAN 2
BP is  better per patient extensive list of documentation she is allergic to Norvasc will stop. atenolol is ok and will change to this

## 2018-11-29 NOTE — PROGRESS NOTE ADULT - PROBLEM SELECTOR PLAN 9
POA .. and this accounts for elevated lactate levels. will repeat in am    continue with antibx   wbc downtrending POA .. and this accounts for elevated lactate levels on admission have resolved  continue with antibx   wbc downtrending

## 2018-11-30 ENCOUNTER — TRANSCRIPTION ENCOUNTER (OUTPATIENT)
Age: 73
End: 2018-11-30

## 2018-11-30 VITALS
SYSTOLIC BLOOD PRESSURE: 133 MMHG | OXYGEN SATURATION: 98 % | HEART RATE: 54 BPM | TEMPERATURE: 98 F | DIASTOLIC BLOOD PRESSURE: 70 MMHG | RESPIRATION RATE: 17 BRPM

## 2018-11-30 LAB
24R-OH-CALCIDIOL SERPL-MCNC: 22.6 NG/ML — LOW (ref 30–80)
ALBUMIN SERPL ELPH-MCNC: 2 G/DL — LOW (ref 3.3–5)
ALP SERPL-CCNC: 198 U/L — HIGH (ref 40–120)
ALT FLD-CCNC: 54 U/L — SIGNIFICANT CHANGE UP (ref 12–78)
ANION GAP SERPL CALC-SCNC: 9 MMOL/L — SIGNIFICANT CHANGE UP (ref 5–17)
AST SERPL-CCNC: 50 U/L — HIGH (ref 15–37)
BILIRUB DIRECT SERPL-MCNC: 1.15 MG/DL — HIGH (ref 0.05–0.2)
BILIRUB INDIRECT FLD-MCNC: 0.6 MG/DL — SIGNIFICANT CHANGE UP (ref 0.2–1)
BILIRUB SERPL-MCNC: 1.7 MG/DL — HIGH (ref 0.2–1.2)
BUN SERPL-MCNC: 16 MG/DL — SIGNIFICANT CHANGE UP (ref 7–23)
CALCIUM SERPL-MCNC: 8 MG/DL — LOW (ref 8.5–10.1)
CHLORIDE SERPL-SCNC: 109 MMOL/L — HIGH (ref 96–108)
CO2 SERPL-SCNC: 25 MMOL/L — SIGNIFICANT CHANGE UP (ref 22–31)
CREAT SERPL-MCNC: 0.78 MG/DL — SIGNIFICANT CHANGE UP (ref 0.5–1.3)
GLUCOSE SERPL-MCNC: 121 MG/DL — HIGH (ref 70–99)
HCT VFR BLD CALC: 36.2 % — SIGNIFICANT CHANGE UP (ref 34.5–45)
HGB BLD-MCNC: 11.9 G/DL — SIGNIFICANT CHANGE UP (ref 11.5–15.5)
MAGNESIUM SERPL-MCNC: 2.3 MG/DL — SIGNIFICANT CHANGE UP (ref 1.6–2.6)
MCHC RBC-ENTMCNC: 28.8 PG — SIGNIFICANT CHANGE UP (ref 27–34)
MCHC RBC-ENTMCNC: 32.9 GM/DL — SIGNIFICANT CHANGE UP (ref 32–36)
MCV RBC AUTO: 87.7 FL — SIGNIFICANT CHANGE UP (ref 80–100)
NRBC # BLD: 0 /100 WBCS — SIGNIFICANT CHANGE UP (ref 0–0)
PHOSPHATE SERPL-MCNC: 1.6 MG/DL — LOW (ref 2.5–4.5)
PLATELET # BLD AUTO: 134 K/UL — LOW (ref 150–400)
POTASSIUM SERPL-MCNC: 3.9 MMOL/L — SIGNIFICANT CHANGE UP (ref 3.5–5.3)
POTASSIUM SERPL-SCNC: 3.9 MMOL/L — SIGNIFICANT CHANGE UP (ref 3.5–5.3)
PROT SERPL-MCNC: 5.4 GM/DL — LOW (ref 6–8.3)
RBC # BLD: 4.13 M/UL — SIGNIFICANT CHANGE UP (ref 3.8–5.2)
RBC # FLD: 14 % — SIGNIFICANT CHANGE UP (ref 10.3–14.5)
SODIUM SERPL-SCNC: 143 MMOL/L — SIGNIFICANT CHANGE UP (ref 135–145)
WBC # BLD: 16.48 K/UL — HIGH (ref 3.8–10.5)
WBC # FLD AUTO: 16.48 K/UL — HIGH (ref 3.8–10.5)

## 2018-11-30 PROCEDURE — 99239 HOSP IP/OBS DSCHRG MGMT >30: CPT

## 2018-11-30 RX ORDER — ATENOLOL 25 MG/1
1 TABLET ORAL
Qty: 30 | Refills: 0
Start: 2018-11-30 | End: 2018-12-29

## 2018-11-30 RX ORDER — CIPROFLOXACIN LACTATE 400MG/40ML
1 VIAL (ML) INTRAVENOUS
Qty: 20 | Refills: 0
Start: 2018-11-30 | End: 2018-12-09

## 2018-11-30 RX ORDER — ESOMEPRAZOLE MAGNESIUM 40 MG/1
1 CAPSULE, DELAYED RELEASE ORAL
Qty: 30 | Refills: 0
Start: 2018-11-30 | End: 2018-12-29

## 2018-11-30 RX ORDER — POLYETHYLENE GLYCOL 3350 17 G/17G
17 POWDER, FOR SOLUTION ORAL DAILY
Qty: 0 | Refills: 0 | Status: DISCONTINUED | OUTPATIENT
Start: 2018-11-30 | End: 2018-11-30

## 2018-11-30 RX ORDER — METRONIDAZOLE 500 MG
1 TABLET ORAL
Qty: 30 | Refills: 0
Start: 2018-11-30 | End: 2018-12-09

## 2018-11-30 RX ORDER — POTASSIUM PHOSPHATE, MONOBASIC POTASSIUM PHOSPHATE, DIBASIC 236; 224 MG/ML; MG/ML
15 INJECTION, SOLUTION INTRAVENOUS ONCE
Qty: 0 | Refills: 0 | Status: COMPLETED | OUTPATIENT
Start: 2018-11-30 | End: 2018-11-30

## 2018-11-30 RX ORDER — URSODIOL 250 MG/1
1 TABLET, FILM COATED ORAL
Qty: 10 | Refills: 0 | OUTPATIENT
Start: 2018-11-30 | End: 2018-12-04

## 2018-11-30 RX ORDER — LACTOBACILLUS ACIDOPHILUS 100MM CELL
1 CAPSULE ORAL
Qty: 28 | Refills: 0
Start: 2018-11-30 | End: 2018-12-13

## 2018-11-30 RX ORDER — SOD,AMMONIUM,POTASSIUM LACTATE
1 CREAM (GRAM) TOPICAL
Qty: 1 | Refills: 0
Start: 2018-11-30 | End: 2018-12-29

## 2018-11-30 RX ORDER — URSODIOL 250 MG/1
1 TABLET, FILM COATED ORAL
Qty: 60 | Refills: 0
Start: 2018-11-30 | End: 2018-12-29

## 2018-11-30 RX ORDER — POLYETHYLENE GLYCOL 3350 17 G/17G
17 POWDER, FOR SOLUTION ORAL
Qty: 170 | Refills: 0
Start: 2018-11-30

## 2018-11-30 RX ORDER — PREGABALIN 225 MG/1
1 CAPSULE ORAL
Qty: 30 | Refills: 0
Start: 2018-11-30 | End: 2018-12-29

## 2018-11-30 RX ADMIN — Medication 600 MILLIGRAM(S): at 00:30

## 2018-11-30 RX ADMIN — PREGABALIN 1000 MICROGRAM(S): 225 CAPSULE ORAL at 11:02

## 2018-11-30 RX ADMIN — PANTOPRAZOLE SODIUM 40 MILLIGRAM(S): 20 TABLET, DELAYED RELEASE ORAL at 08:04

## 2018-11-30 RX ADMIN — POLYETHYLENE GLYCOL 3350 17 GRAM(S): 17 POWDER, FOR SOLUTION ORAL at 10:58

## 2018-11-30 RX ADMIN — URSODIOL 300 MILLIGRAM(S): 250 TABLET, FILM COATED ORAL at 17:28

## 2018-11-30 RX ADMIN — Medication 5 MILLIGRAM(S): at 11:02

## 2018-11-30 RX ADMIN — Medication 1 TABLET(S): at 11:03

## 2018-11-30 RX ADMIN — Medication 1 APPLICATION(S): at 05:38

## 2018-11-30 RX ADMIN — Medication 200 MILLIGRAM(S): at 05:38

## 2018-11-30 RX ADMIN — URSODIOL 300 MILLIGRAM(S): 250 TABLET, FILM COATED ORAL at 05:39

## 2018-11-30 RX ADMIN — Medication 200 MILLIGRAM(S): at 17:02

## 2018-11-30 RX ADMIN — POTASSIUM PHOSPHATE, MONOBASIC POTASSIUM PHOSPHATE, DIBASIC 63.75 MILLIMOLE(S): 236; 224 INJECTION, SOLUTION INTRAVENOUS at 10:55

## 2018-11-30 RX ADMIN — Medication 600 MILLIGRAM(S): at 01:30

## 2018-11-30 RX ADMIN — Medication 100 MILLIGRAM(S): at 05:37

## 2018-11-30 RX ADMIN — Medication 100 MILLIGRAM(S): at 13:27

## 2018-11-30 RX ADMIN — ATENOLOL 25 MILLIGRAM(S): 25 TABLET ORAL at 05:39

## 2018-11-30 NOTE — DISCHARGE NOTE ADULT - SECONDARY DIAGNOSIS.
Sepsis, due to unspecified organism Thrombocytopenia Hypophosphatemia Hypokalemia Hyponatremia Essential hypertension

## 2018-11-30 NOTE — DISCHARGE NOTE ADULT - CARE PLAN
Principal Discharge DX:	Cholangitis  Goal:	complete antibx  Assessment and plan of treatment:	complete antibx  Secondary Diagnosis:	Sepsis, due to unspecified organism  Goal:	complete antibx  Secondary Diagnosis:	Thrombocytopenia  Goal:	improving  Secondary Diagnosis:	Hypophosphatemia  Goal:	resolved  Secondary Diagnosis:	Hypokalemia  Goal:	resolved  Secondary Diagnosis:	Hyponatremia  Goal:	resolved  Secondary Diagnosis:	Essential hypertension  Goal:	take atenolol

## 2018-11-30 NOTE — DISCHARGE NOTE ADULT - CARE PROVIDER_API CALL
Jono Flannery), Gastroenterology; Internal Medicine  [address] [place] [address]  Phone: [phone]  Fax: [phone]    follow up with your primary are doctor,   Phone: (   )    -  Fax: (   )    -

## 2018-11-30 NOTE — DISCHARGE NOTE ADULT - HOSPITAL COURSE
Assessment and Plan:   	  72 year old woman with PMH hiatal hernia, GERD, HTN, breast CA s/p L mastectomy 2011, psh cholecystectomy years ago and CBD obstruction s/p stent exchange x2 yrs ago here c/o n/v, epigastric pain.  She denies fever, chills, bloody stool.  Patient will require admission for at least 2 midnights as detailed below:       Problem/Plan - 1:  ·  Problem: Cholangitis.  Plan: s/p ercp on 11/28/18 case d/w gi and had sludge pus and stent placed . will need f/u in 2-3 weeks with GI for removal .   reviewed admission labs and no blood cultures were done I did  obtain and they were negative   will change to oral cipro and flagyl and f/u up with GI  as outpt .    wbc is improving       ontinue IVF  Zofran PRN for nausea, vomiting  Continue cipro and flagyl    Analgesia PRN.      Problem/Plan - 2:  ·  Problem: Essential hypertension.  Plan: BP is  better per patient extensive list of documented allergies to Norvasc will stop. atenolol is ok and changed to this .      Problem/Plan - 3:  ·  Problem: Arthritis.  Plan: Analgesia PRN.      Problem/Plan - 4:  ·  Problem: Dry skin.  Plan: lac hydrin.      Problem/Plan - 5:  ·  Problem: Hypokalemia.  Plan: resolved.      Problem/Plan - 6:  Problem: Moderate protein-calorie malnutrition. Plan: appreciate nutrition consult.     Problem/Plan - 7:  ·  Problem: Hypophosphatemia.  Plan: resolved.      Problem/Plan - 8:  ·  Problem: Hyponatremia.  Plan: resolved.      Problem/Plan - 9:  ·  Problem: Sepsis, due to unspecified organism.  Plan: POA .. and this accounts for elevated lactate levels on admission have resolved  continue with antibx   wbc downtrending ( of note pt of chronic steroids for approx 11 years ) afebrile at time of discharge     Problem/Plan - 10:  Problem: Thrombocytopenia. Plan; etiology unclear if due to liver dysfunction which is improving  vs sepsis.    nevertheless is platelet count is  improving and no evidence of bleeding   stopped heparin     HIT negative      seen by PT and no needs has straight  cane  TIME SPENT COMPLETING DISCHARGE AND COORDINATING CARE WITH NURSING,  AND CASE MANAGEMENT APPROX 40MINUTES .

## 2018-11-30 NOTE — DISCHARGE NOTE ADULT - PROVIDER TOKENS
TOKEN:'[id number]',FREE:[LAST:[follow up with your primary are doctor],PHONE:[(   )    -],FAX:[(   )    -]]

## 2018-11-30 NOTE — PROGRESS NOTE ADULT - SUBJECTIVE AND OBJECTIVE BOX
HPI:  72 year old woman with PMH hiatal hernia, GERD, HTN, breast CA s/p L mastectomy 2011, psh cholecystectomy years ago and CBD obstruction s/p stent exchange x2 yrs ago here c/o n/v, epigastric pain.  She denies fever, chills, bloody stool.    In the ED, CT ab/plv shows biliary enhancement concerning for cholangitis, several bile duct stones. (26 Nov 2018 05:10)No new complaints. Tolerating oral diet      REVIEW OF SYSTEMS unremarkable      General:	    Skin/Breast:  	  Ophthalmologic:  	  ENMT:	    Respiratory and Thorax:  	  Cardiovascular:	    Gastrointestinal:	    Genitourinary:	    Musculoskeletal:	    Neurological:	    Psychiatric:	    Hematology/Lymphatics:	    Endocrine:	    Allergic/Immunologic:	    MEDICATIONS  (STANDING):  ammonium lactate 12% Lotion 1 Application(s) Topical two times a day  ATENolol  Tablet 25 milliGRAM(s) Oral daily  calcium carbonate 1250 mG  + Vitamin D (OsCal 500 + D) 1 Tablet(s) Oral daily  ciprofloxacin   IVPB 400 milliGRAM(s) IV Intermittent every 12 hours  cyanocobalamin 1000 MICROGram(s) Oral daily  metroNIDAZOLE  IVPB 500 milliGRAM(s) IV Intermittent every 8 hours  pantoprazole    Tablet 40 milliGRAM(s) Oral before breakfast  predniSONE   Tablet 5 milliGRAM(s) Oral every other day  ursodiol Capsule 300 milliGRAM(s) Oral every 12 hours    MEDICATIONS  (PRN):  ibuprofen  Tablet. 600 milliGRAM(s) Oral every 6 hours PRN Temp greater or equal to 38C (100.4F), Mild Pain (1 - 3)  morphine  - Injectable 1 milliGRAM(s) IV Push every 4 hours PRN Severe Pain (7 - 10)  ondansetron Injectable 8 milliGRAM(s) IV Push every 8 hours PRN Nausea and/or Vomiting      Vital Signs Last 24 Hrs  T(C): 36.4 (30 Nov 2018 05:00), Max: 36.6 (29 Nov 2018 11:16)  T(F): 97.6 (30 Nov 2018 05:00), Max: 97.9 (29 Nov 2018 11:16)  HR: 55 (30 Nov 2018 05:00) (54 - 76)  BP: 135/78 (30 Nov 2018 05:00) (131/71 - 142/72)  BP(mean): --  RR: 18 (30 Nov 2018 05:00) (16 - 18)  SpO2: 96% (30 Nov 2018 05:00) (96% - 98%)    PHYSICAL EXAM:      Constitutional:    Eyes: no jaundice    ENMT:    Neck:supple    Breasts:    Back:    Respiratory:normal    Cardiovascular:normal    Gastrointestinal:    Genitourinary:    Rectal:    Extremities:no edema    Vascular:    Neurological:    Skin:    Lymph Nodes:    Musculoskeletal:    Psychiatric:            HEALTH ISSUES - PROBLEM Dx:  Thrombocytopenia: Thrombocytopenia  Sepsis, due to unspecified organism: Sepsis, due to unspecified organism  Hyponatremia: Hyponatremia  Hypophosphatemia: Hypophosphatemia  Moderate protein-calorie malnutrition: Moderate protein-calorie malnutrition  Hypokalemia: Hypokalemia  Dry skin: Dry skin  Preventive measure: Preventive measure  Arthritis: Arthritis  Essential hypertension: Essential hypertension  Cholangitis: Cholangitis            Assesment & Plan:Resolved cholangitis. D/c planning

## 2018-11-30 NOTE — DISCHARGE NOTE ADULT - MEDICATION SUMMARY - MEDICATIONS TO STOP TAKING
I will STOP taking the medications listed below when I get home from the hospital:    amLODIPine 2.5 mg oral tablet  -- 1 tab(s) by mouth once a day    simvastatin 20 mg oral tablet  -- 1 tab(s) by mouth once a day (at bedtime)    metoprolol tartrate 25 mg oral tablet  -- 1 tab(s) by mouth 2 times a day    Zofran ODT 4 mg oral tablet, disintegrating  -- 1 tab(s) by mouth every 8 hours    Carafate 1 g oral tablet  -- 1 tab(s) by mouth once a day (before a meal)   -- Do not take dairy products, antacids, or iron preparations within one hour of this medication.  It is very important that you take or use this exactly as directed.  Do not skip doses or discontinue unless directed by your doctor.  Take medication on an empty stomach 1 hour before or 2 to 3 hours after a meal unless otherwise directed by your doctor.

## 2018-11-30 NOTE — DISCHARGE NOTE ADULT - CONDITIONS AT DISCHARGE
Pt A & O x 4, resps unlabored.  Pt denies discomfort.  Pt evaluated by Dr Louis.  Pt is stable for discharge.

## 2018-11-30 NOTE — DISCHARGE NOTE ADULT - PATIENT PORTAL LINK FT
You can access the [facility] Patient Portal, offered by [facility], by registering with the following website: [email or web address]

## 2018-11-30 NOTE — DISCHARGE NOTE ADULT - MEDICATION SUMMARY - MEDICATIONS TO TAKE
I will START or STAY ON the medications listed below when I get home from the hospital:    predniSONE 5 mg oral tablet  -- 1 tab(s) by mouth every other day  -- Indication: For general     Flagyl 500 mg oral tablet  -- 1 tab(s) by mouth 3 times a day   -- Indication: For CHOLANGITIS    atenolol 25 mg oral tablet  -- 1 tab(s) by mouth once a day  -- Indication: For HTN    ammonium lactate 12% topical lotion  -- 1 application on skin 2 times a day dry skin  -- Indication: For Dry skin    ursodiol 300 mg oral capsule  -- 1 cap(s) by mouth every 12 hours  -- Indication: For CHOLANGITIS    polyethylene glycol 3350 oral powder for reconstitution  -- 17 gram(s) by mouth once a day, As Needed constipation  -- Indication: For Contispation    Acidophilus Probiotic Blend oral capsule  -- 1 cap(s) by mouth 2 times a day   -- Indication: For general    NexIUM 40 mg oral delayed release capsule  -- 1 cap(s) by mouth once a day, As Needed  -- Indication: For GI    Cipro 500 mg oral tablet  -- 1 tab(s) by mouth 2 times a day   -- Indication: For CHOLANGITIS    calcium-vitamin D 500 mg-200 intl units oral tablet  -- 1 tab(s) by mouth once a day  -- Indication: For general    cyanocobalamin 1000 mcg oral tablet  -- 1 tab(s) by mouth once a day  -- Indication: For general

## 2018-11-30 NOTE — DISCHARGE NOTE ADULT - INSTRUCTIONS
Pt and pts son verbalized understanding of discharge instructions, medications, scheduling f/u MD appts.  Written instructions given

## 2018-12-04 DIAGNOSIS — Z79.52 LONG TERM (CURRENT) USE OF SYSTEMIC STEROIDS: ICD-10-CM

## 2018-12-04 DIAGNOSIS — D69.6 THROMBOCYTOPENIA, UNSPECIFIED: ICD-10-CM

## 2018-12-04 DIAGNOSIS — R10.9 UNSPECIFIED ABDOMINAL PAIN: ICD-10-CM

## 2018-12-04 DIAGNOSIS — Z85.3 PERSONAL HISTORY OF MALIGNANT NEOPLASM OF BREAST: ICD-10-CM

## 2018-12-04 DIAGNOSIS — A41.9 SEPSIS, UNSPECIFIED ORGANISM: ICD-10-CM

## 2018-12-04 DIAGNOSIS — E44.0 MODERATE PROTEIN-CALORIE MALNUTRITION: ICD-10-CM

## 2018-12-04 DIAGNOSIS — M81.0 AGE-RELATED OSTEOPOROSIS WITHOUT CURRENT PATHOLOGICAL FRACTURE: ICD-10-CM

## 2018-12-04 DIAGNOSIS — E87.6 HYPOKALEMIA: ICD-10-CM

## 2018-12-04 DIAGNOSIS — K21.9 GASTRO-ESOPHAGEAL REFLUX DISEASE WITHOUT ESOPHAGITIS: ICD-10-CM

## 2018-12-04 DIAGNOSIS — K80.40 CALCULUS OF BILE DUCT WITH CHOLECYSTITIS, UNSPECIFIED, WITHOUT OBSTRUCTION: ICD-10-CM

## 2018-12-04 DIAGNOSIS — E87.1 HYPO-OSMOLALITY AND HYPONATREMIA: ICD-10-CM

## 2018-12-04 DIAGNOSIS — I25.2 OLD MYOCARDIAL INFARCTION: ICD-10-CM

## 2018-12-04 DIAGNOSIS — Z90.49 ACQUIRED ABSENCE OF OTHER SPECIFIED PARTS OF DIGESTIVE TRACT: ICD-10-CM

## 2018-12-04 DIAGNOSIS — Z88.0 ALLERGY STATUS TO PENICILLIN: ICD-10-CM

## 2018-12-04 DIAGNOSIS — E83.39 OTHER DISORDERS OF PHOSPHORUS METABOLISM: ICD-10-CM

## 2018-12-04 DIAGNOSIS — Z28.21 IMMUNIZATION NOT CARRIED OUT BECAUSE OF PATIENT REFUSAL: ICD-10-CM

## 2018-12-04 DIAGNOSIS — I10 ESSENTIAL (PRIMARY) HYPERTENSION: ICD-10-CM

## 2018-12-04 DIAGNOSIS — E03.9 HYPOTHYROIDISM, UNSPECIFIED: ICD-10-CM

## 2018-12-04 DIAGNOSIS — K80.30 CALCULUS OF BILE DUCT WITH CHOLANGITIS, UNSPECIFIED, WITHOUT OBSTRUCTION: ICD-10-CM

## 2018-12-04 LAB
CULTURE RESULTS: SIGNIFICANT CHANGE UP
CULTURE RESULTS: SIGNIFICANT CHANGE UP
SPECIMEN SOURCE: SIGNIFICANT CHANGE UP
SPECIMEN SOURCE: SIGNIFICANT CHANGE UP

## 2018-12-05 ENCOUNTER — EMERGENCY (EMERGENCY)
Facility: HOSPITAL | Age: 73
LOS: 0 days | Discharge: HOME | End: 2018-12-05
Attending: EMERGENCY MEDICINE
Payer: COMMERCIAL

## 2018-12-05 VITALS
OXYGEN SATURATION: 99 % | TEMPERATURE: 98 F | HEART RATE: 70 BPM | RESPIRATION RATE: 18 BRPM | HEIGHT: 62 IN | SYSTOLIC BLOOD PRESSURE: 181 MMHG | DIASTOLIC BLOOD PRESSURE: 80 MMHG | WEIGHT: 134.04 LBS

## 2018-12-05 VITALS
SYSTOLIC BLOOD PRESSURE: 180 MMHG | DIASTOLIC BLOOD PRESSURE: 78 MMHG | OXYGEN SATURATION: 99 % | HEART RATE: 64 BPM | RESPIRATION RATE: 18 BRPM | TEMPERATURE: 98 F

## 2018-12-05 DIAGNOSIS — Z88.0 ALLERGY STATUS TO PENICILLIN: ICD-10-CM

## 2018-12-05 DIAGNOSIS — Z90.12 ACQUIRED ABSENCE OF LEFT BREAST AND NIPPLE: Chronic | ICD-10-CM

## 2018-12-05 DIAGNOSIS — K59.00 CONSTIPATION, UNSPECIFIED: ICD-10-CM

## 2018-12-05 DIAGNOSIS — Z90.49 ACQUIRED ABSENCE OF OTHER SPECIFIED PARTS OF DIGESTIVE TRACT: Chronic | ICD-10-CM

## 2018-12-05 DIAGNOSIS — R10.13 EPIGASTRIC PAIN: ICD-10-CM

## 2018-12-05 DIAGNOSIS — Z79.2 LONG TERM (CURRENT) USE OF ANTIBIOTICS: ICD-10-CM

## 2018-12-05 DIAGNOSIS — Z88.9 ALLERGY STATUS TO UNSPECIFIED DRUGS, MEDICAMENTS AND BIOLOGICAL SUBSTANCES: ICD-10-CM

## 2018-12-05 LAB
ALBUMIN SERPL ELPH-MCNC: 3.1 G/DL — LOW (ref 3.3–5)
ALP SERPL-CCNC: 173 U/L — HIGH (ref 40–120)
ALT FLD-CCNC: 35 U/L — SIGNIFICANT CHANGE UP (ref 12–78)
ANION GAP SERPL CALC-SCNC: 9 MMOL/L — SIGNIFICANT CHANGE UP (ref 5–17)
APTT BLD: 23.5 SEC — LOW (ref 27.5–36.3)
AST SERPL-CCNC: 33 U/L — SIGNIFICANT CHANGE UP (ref 15–37)
BILIRUB SERPL-MCNC: 1.2 MG/DL — SIGNIFICANT CHANGE UP (ref 0.2–1.2)
BUN SERPL-MCNC: 11 MG/DL — SIGNIFICANT CHANGE UP (ref 7–23)
CALCIUM SERPL-MCNC: 8.8 MG/DL — SIGNIFICANT CHANGE UP (ref 8.5–10.1)
CHLORIDE SERPL-SCNC: 104 MMOL/L — SIGNIFICANT CHANGE UP (ref 96–108)
CO2 SERPL-SCNC: 28 MMOL/L — SIGNIFICANT CHANGE UP (ref 22–31)
CREAT SERPL-MCNC: 0.91 MG/DL — SIGNIFICANT CHANGE UP (ref 0.5–1.3)
GLUCOSE SERPL-MCNC: 96 MG/DL — SIGNIFICANT CHANGE UP (ref 70–99)
HCT VFR BLD CALC: 41.2 % — SIGNIFICANT CHANGE UP (ref 34.5–45)
HGB BLD-MCNC: 12.8 G/DL — SIGNIFICANT CHANGE UP (ref 11.5–15.5)
INR BLD: 1.09 RATIO — SIGNIFICANT CHANGE UP (ref 0.88–1.16)
LACTATE SERPL-SCNC: 1.3 MMOL/L — SIGNIFICANT CHANGE UP (ref 0.7–2)
LIDOCAIN IGE QN: 342 U/L — SIGNIFICANT CHANGE UP (ref 73–393)
MCHC RBC-ENTMCNC: 28.4 PG — SIGNIFICANT CHANGE UP (ref 27–34)
MCHC RBC-ENTMCNC: 31.1 GM/DL — LOW (ref 32–36)
MCV RBC AUTO: 91.6 FL — SIGNIFICANT CHANGE UP (ref 80–100)
NRBC # BLD: 0 /100 WBCS — SIGNIFICANT CHANGE UP (ref 0–0)
PLATELET # BLD AUTO: 237 K/UL — SIGNIFICANT CHANGE UP (ref 150–400)
POTASSIUM SERPL-MCNC: 4.2 MMOL/L — SIGNIFICANT CHANGE UP (ref 3.5–5.3)
POTASSIUM SERPL-SCNC: 4.2 MMOL/L — SIGNIFICANT CHANGE UP (ref 3.5–5.3)
PROT SERPL-MCNC: 7.6 GM/DL — SIGNIFICANT CHANGE UP (ref 6–8.3)
PROTHROM AB SERPL-ACNC: 12.2 SEC — SIGNIFICANT CHANGE UP (ref 10–12.9)
RBC # BLD: 4.5 M/UL — SIGNIFICANT CHANGE UP (ref 3.8–5.2)
RBC # FLD: 14.5 % — SIGNIFICANT CHANGE UP (ref 10.3–14.5)
SODIUM SERPL-SCNC: 141 MMOL/L — SIGNIFICANT CHANGE UP (ref 135–145)
TROPONIN I SERPL-MCNC: <.015 NG/ML — SIGNIFICANT CHANGE UP (ref 0.01–0.04)
WBC # BLD: 10.37 K/UL — SIGNIFICANT CHANGE UP (ref 3.8–10.5)
WBC # FLD AUTO: 10.37 K/UL — SIGNIFICANT CHANGE UP (ref 3.8–10.5)

## 2018-12-05 PROCEDURE — 99284 EMERGENCY DEPT VISIT MOD MDM: CPT

## 2018-12-05 PROCEDURE — 74177 CT ABD & PELVIS W/CONTRAST: CPT | Mod: 26

## 2018-12-05 RX ORDER — IOHEXOL 300 MG/ML
30 INJECTION, SOLUTION INTRAVENOUS ONCE
Qty: 0 | Refills: 0 | Status: COMPLETED | OUTPATIENT
Start: 2018-12-05 | End: 2018-12-05

## 2018-12-05 RX ORDER — SODIUM CHLORIDE 9 MG/ML
1000 INJECTION INTRAMUSCULAR; INTRAVENOUS; SUBCUTANEOUS ONCE
Qty: 0 | Refills: 0 | Status: COMPLETED | OUTPATIENT
Start: 2018-12-05 | End: 2018-12-05

## 2018-12-05 RX ORDER — MINERAL OIL
133 OIL (ML) MISCELLANEOUS ONCE
Qty: 0 | Refills: 0 | Status: DISCONTINUED | OUTPATIENT
Start: 2018-12-05 | End: 2018-12-05

## 2018-12-05 RX ORDER — SENNA PLUS 8.6 MG/1
2 TABLET ORAL
Qty: 10 | Refills: 0
Start: 2018-12-05 | End: 2018-12-09

## 2018-12-05 RX ORDER — DOCUSATE SODIUM 100 MG
1 CAPSULE ORAL
Qty: 10 | Refills: 0
Start: 2018-12-05 | End: 2018-12-09

## 2018-12-05 RX ORDER — MULTIVIT WITH MIN/MFOLATE/K2 340-15/3 G
300 POWDER (GRAM) ORAL ONCE
Qty: 0 | Refills: 0 | Status: DISCONTINUED | OUTPATIENT
Start: 2018-12-05 | End: 2018-12-05

## 2018-12-05 RX ADMIN — IOHEXOL 30 MILLILITER(S): 300 INJECTION, SOLUTION INTRAVENOUS at 15:29

## 2018-12-05 RX ADMIN — SODIUM CHLORIDE 1000 MILLILITER(S): 9 INJECTION INTRAMUSCULAR; INTRAVENOUS; SUBCUTANEOUS at 15:00

## 2018-12-05 NOTE — ED PROVIDER NOTE - MEDICAL DECISION MAKING DETAILS
71 yo F with constipation, r/o obstruction  -ct ab/pel ++ contrast, ekg, basic labs, trop, lactate, lipase, mag citrate, enema, Omnipaque, monitor

## 2018-12-05 NOTE — ED ADULT NURSE NOTE - NSIMPLEMENTINTERV_GEN_ALL_ED
Implemented All Universal Safety Interventions:  [place] to call system. Call bell, personal items and telephone within reach. Instruct patient to call for assistance. Room bathroom lighting operational. Non-slip footwear when patient is off stretcher. Physically safe environment: no spills, clutter or unnecessary equipment. Stretcher in lowest position, wheels locked, appropriate side rails in place.

## 2018-12-05 NOTE — ED PROVIDER NOTE - PROGRESS NOTE DETAILS
Results reported to patient--grossly benign, ct shows no acute pathology, labs grossly normal  Pt. reports feeling better after meds  pt. agrees to f/u with primary care outpt. as well as GI  pt. understands to return to ED if symptoms worsen; will d/c with colace/senna

## 2018-12-05 NOTE — ED ADULT TRIAGE NOTE - CHIEF COMPLAINT QUOTE
c/o constipation no bm x 4 days used enema on sun and again on monday denies vomiting states decreased appetite s/p bile duct obstruction last week with stent placed d/c'd fri nov 30th c/o rlq pain/pressure recommended per surgeon  to come to er for eval/ct

## 2018-12-05 NOTE — ED ADULT NURSE NOTE - CHPI ED NUR SYMPTOMS NEG
no dysuria/no fever/no hematuria/no blood in stool/no burning urination/no abdominal distension/no chills/no vomiting/no diarrhea/no nausea

## 2018-12-05 NOTE — ED PROVIDER NOTE - OBJECTIVE STATEMENT
72 F with mild epigastric abd pain, and constipation.  Last BM was 4 days ago.  Her GI doc prescribed stool softeners and enemas with no relief.  Recently d/c'd Nov 30th s/p admit for bile duct obstruction s/p stent placement.  Pt. feels like her abdomen is filling up and she can't get the stool out.  ROS: negative for fever, cough, headache, chest pain, shortness of breath, abd pain, nausea, vomiting, diarrhea, rash, paresthesia, and weakness--all other systems reviewed are negative.   PMH:  hiatal hernia, GERD, HTN, breast CA s/p L mastectomy 2011, psh cholecystectomy years ago and CBD obstruction; Meds: See EMR; SH: Denies smoking/drinking/drug use 72 F with mild epigastric abd pain, and constipation.  Last BM was 4 days ago.  Her GI doc prescribed stool softeners and enemas with no relief.  Recently d/c'd Nov 30th s/p admit for bile duct obstruction s/p stent placement.  Pt. feels like her abdomen is filling up and she can't get the stool out.  ROS: negative for fever, cough, headache, chest pain, shortness of breath, abd pain, nausea, vomiting, diarrhea, rash, paresthesia, and weakness--all other systems reviewed are negative.   PMH:  hiatal hernia, GERD, HTN, breast CA s/p L mastectomy 2011, psh cholecystectomy years ago and CBD obstruction; Meds: See EMR; SH: Denies smoking/drinking/drug use  GI: Dr. Coombs 72 F with mild epigastric abd pain, and constipation.  Last BM was 4 days ago.  Her GI doc prescribed stool softeners and enemas with no relief.  Recently d/c'd Nov 30th s/p admit for bile duct obstruction s/p stent placement.  Pt. feels like her abdomen is filling up and she can't get the stool out.  ROS: negative for fever, cough, headache, chest pain, shortness of breath, nausea, vomiting, diarrhea, rash, paresthesia, and weakness--all other systems reviewed are negative.   PMH:  hiatal hernia, GERD, HTN, breast CA s/p L mastectomy 2011, psh cholecystectomy years ago and CBD obstruction; Meds: See EMR; SH: Denies smoking/drinking/drug use  GI: Dr. Coombs

## 2018-12-05 NOTE — ED PROVIDER NOTE - NSDCPRINTRESULTS_ED_ALL_ED
----- Message from Ben May MD sent at 10/28/2018  3:03 PM CDT -----  Lipids improving. Cont the current statin   Patient requests all Lab and Radiology Results on their Discharge Instructions

## 2018-12-05 NOTE — ED PROVIDER NOTE - PHYSICAL EXAMINATION
Vitals: HTN at 181/80, otherwise WNL  Gen: AAOx3, NAD, sitting uncomfortably in stretcher, non-toxic  Head: ncat, perrla, eomi b/l  Neck: supple, no lymphadenopathy, no midline deviation  Heart: rrr, no m/r/g  Lungs: CTA b/l, no rales/ronchi/wheezes  Abd: soft, nontender, non-distended, no rebound or guarding  Ext: no clubbing/cyanosis/edema  Neuro: sensation and muscle strength intact b/l, steady gait

## 2020-10-05 ENCOUNTER — RESULT REVIEW (OUTPATIENT)
Age: 75
End: 2020-10-05

## 2020-11-16 NOTE — PROVIDER CONTACT NOTE (CRITICAL VALUE NOTIFICATION) - NAME OF MD/NP/PA/DO NOTIFIED:
Dr Dee Mid-Level Procedure Text (A): After obtaining clear surgical margins the patient was sent to a mid-level provider for surgical repair.  The patient understands they will receive post-surgical care and follow-up from the mid-level provider.

## 2021-06-08 ENCOUNTER — EMERGENCY (EMERGENCY)
Facility: HOSPITAL | Age: 76
LOS: 0 days | Discharge: HOME | End: 2021-06-09
Attending: STUDENT IN AN ORGANIZED HEALTH CARE EDUCATION/TRAINING PROGRAM
Payer: MEDICARE

## 2021-06-08 VITALS
WEIGHT: 138.01 LBS | HEIGHT: 62 IN | HEART RATE: 87 BPM | TEMPERATURE: 98 F | OXYGEN SATURATION: 97 % | RESPIRATION RATE: 19 BRPM | SYSTOLIC BLOOD PRESSURE: 173 MMHG | DIASTOLIC BLOOD PRESSURE: 94 MMHG

## 2021-06-08 DIAGNOSIS — Z91.018 ALLERGY TO OTHER FOODS: ICD-10-CM

## 2021-06-08 DIAGNOSIS — Z88.8 ALLERGY STATUS TO OTHER DRUGS, MEDICAMENTS AND BIOLOGICAL SUBSTANCES STATUS: ICD-10-CM

## 2021-06-08 DIAGNOSIS — K44.9 DIAPHRAGMATIC HERNIA WITHOUT OBSTRUCTION OR GANGRENE: ICD-10-CM

## 2021-06-08 DIAGNOSIS — Z90.49 ACQUIRED ABSENCE OF OTHER SPECIFIED PARTS OF DIGESTIVE TRACT: ICD-10-CM

## 2021-06-08 DIAGNOSIS — Z90.49 ACQUIRED ABSENCE OF OTHER SPECIFIED PARTS OF DIGESTIVE TRACT: Chronic | ICD-10-CM

## 2021-06-08 DIAGNOSIS — M79.89 OTHER SPECIFIED SOFT TISSUE DISORDERS: ICD-10-CM

## 2021-06-08 DIAGNOSIS — Z88.0 ALLERGY STATUS TO PENICILLIN: ICD-10-CM

## 2021-06-08 DIAGNOSIS — Z85.3 PERSONAL HISTORY OF MALIGNANT NEOPLASM OF BREAST: ICD-10-CM

## 2021-06-08 DIAGNOSIS — R06.02 SHORTNESS OF BREATH: ICD-10-CM

## 2021-06-08 DIAGNOSIS — Z20.822 CONTACT WITH AND (SUSPECTED) EXPOSURE TO COVID-19: ICD-10-CM

## 2021-06-08 DIAGNOSIS — I10 ESSENTIAL (PRIMARY) HYPERTENSION: ICD-10-CM

## 2021-06-08 DIAGNOSIS — Z90.12 ACQUIRED ABSENCE OF LEFT BREAST AND NIPPLE: Chronic | ICD-10-CM

## 2021-06-08 DIAGNOSIS — I89.0 LYMPHEDEMA, NOT ELSEWHERE CLASSIFIED: ICD-10-CM

## 2021-06-08 DIAGNOSIS — R06.00 DYSPNEA, UNSPECIFIED: ICD-10-CM

## 2021-06-08 DIAGNOSIS — M85.80 OTHER SPECIFIED DISORDERS OF BONE DENSITY AND STRUCTURE, UNSPECIFIED SITE: ICD-10-CM

## 2021-06-08 DIAGNOSIS — M06.9 RHEUMATOID ARTHRITIS, UNSPECIFIED: ICD-10-CM

## 2021-06-08 DIAGNOSIS — G47.419 NARCOLEPSY WITHOUT CATAPLEXY: ICD-10-CM

## 2021-06-08 DIAGNOSIS — I25.2 OLD MYOCARDIAL INFARCTION: ICD-10-CM

## 2021-06-08 DIAGNOSIS — M19.90 UNSPECIFIED OSTEOARTHRITIS, UNSPECIFIED SITE: ICD-10-CM

## 2021-06-08 DIAGNOSIS — H20.00 UNSPECIFIED ACUTE AND SUBACUTE IRIDOCYCLITIS: ICD-10-CM

## 2021-06-08 DIAGNOSIS — Z90.12 ACQUIRED ABSENCE OF LEFT BREAST AND NIPPLE: ICD-10-CM

## 2021-06-08 DIAGNOSIS — B02.9 ZOSTER WITHOUT COMPLICATIONS: ICD-10-CM

## 2021-06-08 LAB
ALBUMIN SERPL ELPH-MCNC: 2.5 G/DL — LOW (ref 3.3–5)
ALP SERPL-CCNC: 54 U/L — SIGNIFICANT CHANGE UP (ref 40–120)
ALT FLD-CCNC: 8 U/L — LOW (ref 12–78)
ANION GAP SERPL CALC-SCNC: 4 MMOL/L — LOW (ref 5–17)
APTT BLD: 32.6 SEC — SIGNIFICANT CHANGE UP (ref 27.5–35.5)
AST SERPL-CCNC: 12 U/L — LOW (ref 15–37)
BASOPHILS # BLD AUTO: 0.03 K/UL — SIGNIFICANT CHANGE UP (ref 0–0.2)
BASOPHILS NFR BLD AUTO: 0.4 % — SIGNIFICANT CHANGE UP (ref 0–2)
BILIRUB SERPL-MCNC: 0.3 MG/DL — SIGNIFICANT CHANGE UP (ref 0.2–1.2)
BUN SERPL-MCNC: 12 MG/DL — SIGNIFICANT CHANGE UP (ref 7–23)
CALCIUM SERPL-MCNC: 8.3 MG/DL — LOW (ref 8.5–10.1)
CHLORIDE SERPL-SCNC: 106 MMOL/L — SIGNIFICANT CHANGE UP (ref 96–108)
CO2 SERPL-SCNC: 28 MMOL/L — SIGNIFICANT CHANGE UP (ref 22–31)
CREAT SERPL-MCNC: 0.7 MG/DL — SIGNIFICANT CHANGE UP (ref 0.5–1.3)
EOSINOPHIL # BLD AUTO: 0.09 K/UL — SIGNIFICANT CHANGE UP (ref 0–0.5)
EOSINOPHIL NFR BLD AUTO: 1.1 % — SIGNIFICANT CHANGE UP (ref 0–6)
FLUAV AG NPH QL: SIGNIFICANT CHANGE UP
FLUBV AG NPH QL: SIGNIFICANT CHANGE UP
GLUCOSE SERPL-MCNC: 93 MG/DL — SIGNIFICANT CHANGE UP (ref 70–99)
HCT VFR BLD CALC: 33.5 % — LOW (ref 34.5–45)
HGB BLD-MCNC: 10.4 G/DL — LOW (ref 11.5–15.5)
IMM GRANULOCYTES NFR BLD AUTO: 0.5 % — SIGNIFICANT CHANGE UP (ref 0–1.5)
INR BLD: 1.17 RATIO — HIGH (ref 0.88–1.16)
LYMPHOCYTES # BLD AUTO: 1.07 K/UL — SIGNIFICANT CHANGE UP (ref 1–3.3)
LYMPHOCYTES # BLD AUTO: 13.6 % — SIGNIFICANT CHANGE UP (ref 13–44)
MCHC RBC-ENTMCNC: 27.5 PG — SIGNIFICANT CHANGE UP (ref 27–34)
MCHC RBC-ENTMCNC: 31 GM/DL — LOW (ref 32–36)
MCV RBC AUTO: 88.6 FL — SIGNIFICANT CHANGE UP (ref 80–100)
MONOCYTES # BLD AUTO: 0.89 K/UL — SIGNIFICANT CHANGE UP (ref 0–0.9)
MONOCYTES NFR BLD AUTO: 11.3 % — SIGNIFICANT CHANGE UP (ref 2–14)
NEUTROPHILS # BLD AUTO: 5.76 K/UL — SIGNIFICANT CHANGE UP (ref 1.8–7.4)
NEUTROPHILS NFR BLD AUTO: 73.1 % — SIGNIFICANT CHANGE UP (ref 43–77)
NRBC # BLD: 0 /100 WBCS — SIGNIFICANT CHANGE UP (ref 0–0)
NT-PROBNP SERPL-SCNC: 427 PG/ML — SIGNIFICANT CHANGE UP (ref 0–450)
PLATELET # BLD AUTO: 254 K/UL — SIGNIFICANT CHANGE UP (ref 150–400)
POTASSIUM SERPL-MCNC: 4.2 MMOL/L — SIGNIFICANT CHANGE UP (ref 3.5–5.3)
POTASSIUM SERPL-SCNC: 4.2 MMOL/L — SIGNIFICANT CHANGE UP (ref 3.5–5.3)
PROT SERPL-MCNC: 6.9 GM/DL — SIGNIFICANT CHANGE UP (ref 6–8.3)
PROTHROM AB SERPL-ACNC: 13.5 SEC — SIGNIFICANT CHANGE UP (ref 10.6–13.6)
RBC # BLD: 3.78 M/UL — LOW (ref 3.8–5.2)
RBC # FLD: 13 % — SIGNIFICANT CHANGE UP (ref 10.3–14.5)
SARS-COV-2 RNA SPEC QL NAA+PROBE: SIGNIFICANT CHANGE UP
SODIUM SERPL-SCNC: 138 MMOL/L — SIGNIFICANT CHANGE UP (ref 135–145)
TROPONIN I SERPL-MCNC: <.015 NG/ML — SIGNIFICANT CHANGE UP (ref 0.01–0.04)
WBC # BLD: 7.88 K/UL — SIGNIFICANT CHANGE UP (ref 3.8–10.5)
WBC # FLD AUTO: 7.88 K/UL — SIGNIFICANT CHANGE UP (ref 3.8–10.5)

## 2021-06-08 PROCEDURE — 93970 EXTREMITY STUDY: CPT | Mod: 26

## 2021-06-08 PROCEDURE — 71045 X-RAY EXAM CHEST 1 VIEW: CPT | Mod: 26

## 2021-06-08 PROCEDURE — 93010 ELECTROCARDIOGRAM REPORT: CPT

## 2021-06-08 PROCEDURE — 71275 CT ANGIOGRAPHY CHEST: CPT | Mod: 26,MA

## 2021-06-08 PROCEDURE — 99284 EMERGENCY DEPT VISIT MOD MDM: CPT

## 2021-06-08 NOTE — ED PROVIDER NOTE - NSFOLLOWUPCLINICS_GEN_ALL_ED_FT
[facility]  Cardiology  [address] [place] [id number]  Phone: [phone]  Fax:

## 2021-06-08 NOTE — ED PROVIDER NOTE - PHYSICAL EXAMINATION
VITAL SIGNS: I have reviewed nursing notes and confirm.   GEN: Well-developed; well-nourished; in no acute distress. Speaking full sentences.  SKIN: Warm, pink, no rash, no diaphoresis, no cyanosis, well perfused.   HEAD: Normocephalic; atraumatic. No scalp lacerations, no abrasions.  NECK: Supple; non tender.   EYES: Pupils 3mm equal, round, reactive to light and accomodation, conjunctiva and sclera clear. Extra-ocular movements intact bilaterally.  ENT: No nasal discharge; airway clear. Trachea is midline. ORAL: No oropharyngeal exudates or erythema. Normal dentition.  CV: Regular rate and rhythm. S1, S2 normal; no murmurs, gallops, or rubs. (+) lower extremity pitting edema up to knees bilaterally. Capillary refill < 2 seconds throughout. Distal pulses intact 2+ throughout.  RESP: CTA bilaterally. No wheezes, rales, or rhonchi.   ABD: Normal bowel sounds, soft, non-distended, non-tender, no hepatosplenomegaly. No CVA tenderness bilaterally.  MSK: Normal range of motion and movement of all 4 extremities. No joint or muscular pain throughout. No clubbing.   BACK: No thoracolumbar midline or paravertebral tenderness. No step-offs or obvious deformities.  NEURO: Alert & oriented x 3, Grossly unremarkable. Sensory and motor intact throughout. No focal deficits Normal speech and coordination.   PSYCH: Cooperative, appropriate.

## 2021-06-08 NOTE — ED PROVIDER NOTE - OBJECTIVE STATEMENT
75F PMHx of breast cancer s/p left mastectomy, HTN, HLD, CAD, hiatal hernia, s/p, cholecystectomy + CBD obstruction presenting with SOB x few days and LE edema x months. Sent in by PMD for rule out PE. Describes progressive dyspnea over past few days with associated LE edema up to knees. Denies any chest pain, fevers, coughs, headaches, neck pain, neck strain, abdominal pain, syncope, lightheadedness,  recent travel, recent surgery, immobility,  hemoptysis, hormone use.

## 2021-06-08 NOTE — ED PROVIDER NOTE - CLINICAL SUMMARY MEDICAL DECISION MAKING FREE TEXT BOX
ddx: PE rule out, CHF?, chronic lymphedema, ACS/NSTEMI but unlikely due to the chronic nature of the problem, infectious causes unlikely with no fever.

## 2021-06-08 NOTE — ED PROVIDER NOTE - CARE PLAN
Principal Discharge DX:	Lymphedema  Secondary Diagnosis:	Rheumatoid arthritis involving right hand, unspecified whether rheumatoid factor present

## 2021-06-08 NOTE — ED PROVIDER NOTE - PR
Preparing for Your Surgery      Name:  Kayleigh Rocha   MRN:  [id number]   :  1962   Today's Date:  4/3/2017     Arriving for surgery:  Surgery date:  17  Surgery time:  0800 AM  Arrival time:  0600 AM  Please come to:       [facility] [address]  [address] [place]  [address]    -   parking is available in front of the hospital from 5:15 am to 8:00 pm    -  Stop at the Information Desk in the lobby    -   Inform the information person that you are here for surgery. An escort to 3c will be provided. If you would not like an escort, please proceed to 3C on the 3rd floor. [phone]     What can I eat or drink?  -  You may have solid food or milk products until 8 hours prior to your surgery. 12 [place] Monday raul.  -  You may have water, apple juice or 7up/Sprite until 2 hours prior to your surgery. 0600 AM Tuesday    Which medicines can I take?  -  Do NOT take these medications in the morning, the day of surgery:  STOP IBUPROFEN AFTER 17    -  Please take these medications the day of surgery:  Pain meds as needed.    How do I prepare myself?  -  Take two showers: one the night before surgery; and one the morning of surgery.         Use Scrubcare or Hibiclens to wash from neck down.  You may use your own shampoo and conditioner. No other hair products.   -  Do NOT use lotion, powder, deodorant, or antiperspirant the day of your surgery.  -  Do NOT wear any makeup, fingernail polish or jewelry.  -  Begin using Incentive Spirometer 1 week prior to surgery.  Use 4 times per day, up to 5-10 breaths each time.  Bring Incentive Spirometer to hospital.  -Do not bring your own medications to the hospital, except for inhalers and eye drops.  -  Bring your ID and insurance card.    Questions or Concerns:  If you have questions or concerns, please call the  Preoperative Assessment Center, Monday-Friday 7AM-7PM:  [phone]    AFTER YOUR  SURGERY  Breathing exercises   Breathing exercises help you recover faster. Take deep breaths and let the air out slowly. This will:     Help you wake up after surgery.    Help prevent complications like pneumonia.  Preventing complications will help you go home sooner.   We may give you a breathing device (incentive spirometer) to encourage you to breathe deeply.   Nausea and vomiting   You may feel sick to your stomach after surgery; if so, let your nurse know.    Pain control:  After surgery, you may have pain. Our goal is to help you manage your pain. Pain medicine will help you feel comfortable enough to do activities that will help you heal.  These activities may include breathing exercises, walking and physical therapy.   To help your health care team treat your pain we will ask: 1) If you have pain  2) where it is located 3) describe your pain in your words  Methods of pain control include medications given by mouth, vein or by nerve block for some surgeries.  We may give you a pain control pump that will:  1) Deliver the medicine through a tube placed in your vein  2) Control the amount of medicine you receive  3) Allow you to push a button to deliver a dose of pain medicine  Sequential Compression Device (SCD) or Pneumo Boots:  You may need to wear SCD S on your legs or feet. These are wraps connected to a machine that pumps in air and releases it. The repeated pumping helps prevent blood clots from forming.              124

## 2021-06-08 NOTE — ED PROVIDER NOTE - PATIENT PORTAL LINK FT
You can access the FollowMyHealth Patient Portal offered by [facility] by registering at the following website: [email or web address] By joining [facility]’s FollowMyHealth portal, you will also be able to view your health information using other applications (apps) compatible with our system.

## 2021-06-08 NOTE — ED ADULT NURSE NOTE - OBJECTIVE STATEMENT
Patient A& o x3 came in with complaints of bilateral leg swelling and they are pitting and her right hand is swollen also. Patient has noticed the swelling worse since April. Patient has trouble getting out of bed due to the swelling Patient had a stent put in november 2019 and it was taken out because " it messed up her liver and kidneys." no sob at this time but sob on exertion this AM. no chest pain, no dizziness.

## 2021-06-08 NOTE — ED PROVIDER NOTE - NSFOLLOWUPINSTRUCTIONS_ED_ALL_ED_FT
Rest, drink plenty of fluids.  Advance activity as tolerated.  Continue all previously prescribed medications as directed.  Follow up with your PMD 2-3 days and bring copies of your results.  Return to the ER for worsening symptoms, fevers, shortness of breath, chest pain, lightheadedness, or new concerning symptoms.    Take acetaminophen 650 mg orally every 6-8 hours for pain control as needed. Please do not exceed 4,000 mg of acetaminophen during a 24 hours period. Acetaminophen can be found in many over-the-counter cold medications as well as opioid medications that may be given for pain.    Take ibuprofen (also known as MOTRIN or ADVIL) 400 mg orally every 6-8 hours for pain control as needed with food to avoid an upset stomach. Ibuprofen can be found in many over-the-counter medications. Please do not take ibuprofen if you have a bleeding disorder, stomach or gastrointestinal ulcer, or liver disease.    If needed, you can alternate these medications so that you can take one medication every 3 hours. For example, at noon take ibuprofen, then at 3PM take acetaminophen, then at 6PM take ibuprofen.      ,

## 2021-06-08 NOTE — ED PROVIDER NOTE - PROGRESS NOTE DETAILS
CTA chest negative for PE, Doppler negative for DVT. Labs trop negative, bnp negative. covid negative, ekg negative for trung/std or ischemic changes. US rue negative for DVT, XR of hand negative for fractures, possibly RA flare? I have discussed with the patient about the ED workup, lab results, diagnostics results, plan for discharge home, need for follow-up with primary care physician/specialists, and return precautions. At this time, the patient does not require further workup in the ED. The patient is subjectively feeling better and would like to be discharged home. The patient had the opportunity to ask questions and I have answered all inquiries. The patient verbalizes understanding and agreement with the plan. The patient is hemodynamically stable, clinically well-appearing, ambulatory, mentating well and ready for discharge home.

## 2021-06-08 NOTE — ED ADULT TRIAGE NOTE - CHIEF COMPLAINT QUOTE
patient c/o of difficulty breathing, with bilateral lower extremities swelling and R hand swelling history of breast CA , send her by Md to R/A DVT PE

## 2021-06-09 VITALS — TEMPERATURE: 98 F | DIASTOLIC BLOOD PRESSURE: 85 MMHG | SYSTOLIC BLOOD PRESSURE: 138 MMHG | HEART RATE: 57 BPM

## 2021-06-09 PROCEDURE — 93971 EXTREMITY STUDY: CPT | Mod: 26,RT

## 2021-06-09 PROCEDURE — 73120 X-RAY EXAM OF HAND: CPT | Mod: 26,RT

## 2022-02-02 NOTE — DISCHARGE NOTE ADULT - DISCHARGE TO
Prediabetes
Severe mitral regurgitation
Prediabetes
Severe mitral regurgitation
Home

## 2022-02-17 NOTE — PATIENT PROFILE ADULT - NSPROPTRIGHTCAREGIVER_GEN_A_NUR
HISTORY AND PHYSICAL  Patient: Milan Silva Date: 2/16/2022   male, 49 year old  Admit Date: 2/16/2022   Attending: George Yates MD    DATE OF SERVICE 2/16/2022  PRIMARY CARE PROVIDER:  Kyle Wang MD   ATTENDING PHYSICIAN    George Yates MD  CHIEF COMPLAINT    Transferred from [facility] for management of right lower ext infection /post Right knee arthroplasty .   HPI    Milan Silva is a 49 year old male who transferred to our hospital from OSH ER for further management of fever, pain , redness of right lower er ext.  Patient post Right lower ext arthroplasty . Orthopedic contacted and accepted patient to be transferred to our facility for management.    Patient reported fever started 3 days ago , yesterday he noticed redness and swelling of right leg . Today the redness/swelling extended up to involve some area of thigh .    He reported that he completed post surgery po anticoagulation as recommended by   .      In ER of OSH , he had Venous doppler of Right lower ext , result negative for dvt.  Vancomycin and rocephin given .  transfer to our hospital.    He is comfortable in bed, denied chest pain or sob.  Denied cough . Denied nausea. Just finished dinner.  Denied dysuria .  Reported hx of sleep apnea ,but not wearing cpap currently as cpap machine not working . He is working on get new one .    Denied diarrhea .     Orthopedic specialist aware patient arrived to the hospital.        PAST MEDICAL & SURGICAL HISTORY    Past Medical History:   Diagnosis Date   • Arthritis     mod knee, severe patellofemoral   • Cirrhosis (CMS/HCC)    • CTS (carpal tunnel syndrome)     bilteral   • DMII (diabetes mellitus, type 2) (CMS/HCC)    • HTN (hypertension)    • SARY (obstructive sleep apnea)      Past Surgical History:   Procedure Laterality Date   • Total knee replacement      both knees --right 12/2019    • Wrist surgery      carpel tunnel both wrists       CURRENT MEDICATIONS- medication list  and allergies personally reviewed in [facility]  Home medications review and reconciliation in process  ALLERGIES  ALLERGIES:  No Known Allergies  SOCIAL HISTORY    Social History     Tobacco Use   • Smoking status: Never Smoker   • Smokeless tobacco: Never Used   Vaping Use   • Vaping Use: never used   Substance Use Topics   • Alcohol use: Yes     Alcohol/week: 0.8 standard drinks     Types: 1 drink(s) per week   • Drug use: No     FAMILY HISTORY    Family History   Problem Relation Age of Onset   • Diabetes Maternal Grandmother    • COPD Mother    • Patient is unaware of any medical problems Father    • Heart disease Sister    • Diabetes Brother      REVIEW OF SYSTEMS    All 14 Systems were reviewed and found to be negative except what's mentioned in HPI    PHYSICAL EXAMINATION    Vital 24 Hour Range Most Recent Value   Temperature Temp  Min: 98.3 °F (36.8 °C)  Max: 101.2 °F (38.4 °C) 98.3 °F (36.8 °C)   Pulse Pulse  Min: 91  Max: 116 91   Respiratory Resp  Min: 16  Max: 28 16   Blood Pressure BP  Min: 137/80  Max: 167/86 (!) 151/78   Pulse Oximetry SpO2  Min: 95 %  Max: 99 % 95 %   General:  Alert & Oriented X 3 in no acute distress; mood and affect appropriate for situation; good insight; well nourished/well developed  Head/ENT:                Normocephalic/atraumatic; pupils are equal, round and reactive to light & accommodation; oral/nasal mucosa mild dryness noticed    Neck:   Supple; trachea is midline. No limitation of neck move.      Respiratory:  Clear to auscultation bilaterally; no use of accessory muscles; no   Wheezing .    Cardiovascular: Regular rate and rhythm and S1, S2 present; no jugular venous distention  Abdomen:  Soft; nontender/nondistended;  + bowel sounds; No guarding or rebound; No peritoneal signs; no rigidity .    Musculoskeletal:   Right lowe ext redness ,swelling and tenderness involving the leg /knee/and partial thigh .  Able to bend knee but with limitation sec to pain&swelling.  Positive  dorsalis pedis 2+     Extremities/Skin: No focal neurologic deficit noticed      LABS      Recent Labs   Lab 02/16/22 2127 02/16/22  1412   SODIUM 137 137   POTASSIUM 3.7 3.8   CHLORIDE 106 103   CO2 25 25   BUN 16 18   CREATININE 0.98 1.06   GLUCOSE 94 85   WBC 10.0 10.1   HGB 12.0* 12.8*   HCT 35.4* 37.5*    140   PT  --  11.6   INR  --  1.1   PTT  --  32*     XR Chest AP or PA    Result Date: 2/16/2022  EXAMINATION: XR CHEST AP OR PA, XR KNEE 4+ VW RIGHT HISTORY: Fever COMPARISON: Knee x-ray from January 5, 2022     FINDINGS/IMPRESSION: Chest: The heart and mediastinum are normal. The costophrenic angles are sharp. The vasculature is normal. The lungs are clear without pneumothorax. Right knee: There is a stable appearance of a hinged right knee arthroplasty. There is a stable thin 1 mm lucency at the hardware bone interface of the tibial component on lateral projection. There is no significant effusion. There is mild indistinctness of the extensor mechanism soft tissue shadow. There is a stable small dystrophic calcification in the soft tissues over the anterior thigh.     XR Knee 4+ View Right    Result Date: 2/16/2022  EXAMINATION: XR CHEST AP OR PA, XR KNEE 4+ VW RIGHT HISTORY: Fever COMPARISON: Knee x-ray from January 5, 2022     FINDINGS/IMPRESSION: Chest: The heart and mediastinum are normal. The costophrenic angles are sharp. The vasculature is normal. The lungs are clear without pneumothorax. Right knee: There is a stable appearance of a hinged right knee arthroplasty. There is a stable thin 1 mm lucency at the hardware bone interface of the tibial component on lateral projection. There is no significant effusion. There is mild indistinctness of the extensor mechanism soft tissue shadow. There is a stable small dystrophic calcification in the soft tissues over the anterior thigh.     US Lower Extremity Venous Duplex Right    Result Date: 2/16/2022  EXAMINATION: US LOWER EXTREMITY VENOUS DUPLEX  RIGHT HISTORY: swelling pain TECHNIQUE: Color Doppler flow imaging with pulsed duplex sonography of the right lower extremity femoral popliteal deep venous system is performed. FINDINGS: The right lower venous system including the common femoral vein, superficial femoral vein, and popliteal veins demonstrate normal compressibility with normal grayscale and color flow characteristics. There is incidental note of mildly prominent right groin lymph nodes. Attention at clinical follow-up is advised.     IMPRESSION: No DVT          CODE STATUS- Full Resuscitation    ASSESSMENT & PLAN:    #Status post  revision right total knee arthroplasty by    #Right lower ext infection /post surgery .  #elevated pro calcitonin sec to above  #Normal lactic acid   #DM2 ,low blood sugar noticed    NPO except med after mid night  Pending Orthopedic evaluation and rec.  Broad coverage antibiotic vanc and zosyn  Fallow up blood culture result .  IV Fluid   SSI and accucheck.    DVT PPx: scds, Hold for now pharmacologist dvt ppx concerning possible orthopedic surgery /intervenstion.  DVT ppx as per Orthopedic surgeon .    Case and plan discussed in details with patient bedside . All questions answered.  Discussed with Alejandro Bennett MD  2/16/2022  11:13 PM     yes

## 2022-03-25 NOTE — ED ADULT NURSE NOTE - BOWEL SOUNDS RLQ
Render Note In Bullet Format When Appropriate: No Detail Level: Detailed Post-Care Instructions: I reviewed with the patient in detail post-care instructions. Patient is to wear sunprotection, and avoid picking at any of the treated lesions. Pt may apply Vaseline to crusted or scabbing areas. Consent: The patient's consent was obtained including but not limited to risks of crusting, scabbing, blistering, scarring, darker or lighter pigmentary change, recurrence, incomplete removal and infection. Show Applicator Variable?: Yes Number Of Freeze-Thaw Cycles: 2 freeze-thaw cycles Duration Of Freeze Thaw-Cycle (Seconds): 5 hypoactive

## 2022-04-25 NOTE — ED ADULT NURSE NOTE - CARDIO WDL
Is This A New Presentation, Or A Follow-Up?: Skin Lesion Normal rate, regular rhythm, normal S1, S2 heart sounds heard.

## 2022-09-13 NOTE — H&P ADULT - PROBLEM/PLAN-2
DISPLAY PLAN FREE TEXT
I personally evaluated the patient. I reviewed the Resident’s or Physician Assistant’s note (as assigned above), and agree with the findings and plan except as documented in my note. 16-month-old female presents to the ED after falling from a step in the house.  No vomiting and no LOC.  She hit her mouth on the step and parents are concerned about the bleeding to her lip and the alignment of her teeth.  She is otherwise been well with no fever, vomiting, cough, congestion, abdominal pain, or rash.  Physical Exam: VS reviewed. Pt is well appearing, in no respiratory distress. MMM. Cap refill <2 seconds. Mouth: Superficial laceration to the inner aspect of her upper lip.  Teeth D, E, F and G are slightly intruded.  No laxity of teeth.  Skin with no obvious rash noted.  Chest with no retractions, no distress. Neuro exam grossly intact.  Plan: Family reassured, dental follow-up advised.  Motrin/Tylenol advised for pain with a soft diet.

## 2023-03-13 ENCOUNTER — INPATIENT (INPATIENT)
Facility: HOSPITAL | Age: 78
LOS: 1 days | Discharge: HOME HEALTH | End: 2023-03-15
Attending: INTERNAL MEDICINE | Admitting: INTERNAL MEDICINE
Payer: COMMERCIAL

## 2023-03-13 VITALS
HEART RATE: 107 BPM | DIASTOLIC BLOOD PRESSURE: 76 MMHG | OXYGEN SATURATION: 96 % | RESPIRATION RATE: 16 BRPM | TEMPERATURE: 98 F | WEIGHT: 100.09 LBS | HEIGHT: 65 IN | SYSTOLIC BLOOD PRESSURE: 116 MMHG

## 2023-03-13 DIAGNOSIS — Z90.49 ACQUIRED ABSENCE OF OTHER SPECIFIED PARTS OF DIGESTIVE TRACT: Chronic | ICD-10-CM

## 2023-03-13 DIAGNOSIS — Z90.12 ACQUIRED ABSENCE OF LEFT BREAST AND NIPPLE: Chronic | ICD-10-CM

## 2023-03-13 LAB
ALBUMIN SERPL ELPH-MCNC: 3.7 G/DL — SIGNIFICANT CHANGE UP (ref 3.3–5)
ALP SERPL-CCNC: 102 U/L — SIGNIFICANT CHANGE UP (ref 40–120)
ALT FLD-CCNC: 30 U/L — SIGNIFICANT CHANGE UP (ref 12–78)
ANION GAP SERPL CALC-SCNC: 4 MMOL/L — LOW (ref 5–17)
ANION GAP SERPL CALC-SCNC: 6 MMOL/L — SIGNIFICANT CHANGE UP (ref 5–17)
APPEARANCE UR: CLEAR — SIGNIFICANT CHANGE UP
AST SERPL-CCNC: 52 U/L — HIGH (ref 15–37)
BACTERIA # UR AUTO: ABNORMAL
BASOPHILS # BLD AUTO: 0.02 K/UL — SIGNIFICANT CHANGE UP (ref 0–0.2)
BASOPHILS NFR BLD AUTO: 0.2 % — SIGNIFICANT CHANGE UP (ref 0–2)
BILIRUB SERPL-MCNC: 0.7 MG/DL — SIGNIFICANT CHANGE UP (ref 0.2–1.2)
BILIRUB UR-MCNC: NEGATIVE — SIGNIFICANT CHANGE UP
BUN SERPL-MCNC: 23 MG/DL — SIGNIFICANT CHANGE UP (ref 7–23)
BUN SERPL-MCNC: 27 MG/DL — HIGH (ref 7–23)
CALCIUM SERPL-MCNC: 8.5 MG/DL — SIGNIFICANT CHANGE UP (ref 8.5–10.1)
CALCIUM SERPL-MCNC: 8.8 MG/DL — SIGNIFICANT CHANGE UP (ref 8.5–10.1)
CHLORIDE SERPL-SCNC: 102 MMOL/L — SIGNIFICANT CHANGE UP (ref 96–108)
CHLORIDE SERPL-SCNC: 99 MMOL/L — SIGNIFICANT CHANGE UP (ref 96–108)
CO2 SERPL-SCNC: 26 MMOL/L — SIGNIFICANT CHANGE UP (ref 22–31)
CO2 SERPL-SCNC: 29 MMOL/L — SIGNIFICANT CHANGE UP (ref 22–31)
COLOR SPEC: YELLOW — SIGNIFICANT CHANGE UP
CREAT SERPL-MCNC: 1.1 MG/DL — SIGNIFICANT CHANGE UP (ref 0.5–1.3)
CREAT SERPL-MCNC: 1.53 MG/DL — HIGH (ref 0.5–1.3)
DIFF PNL FLD: NEGATIVE — SIGNIFICANT CHANGE UP
EGFR: 35 ML/MIN/1.73M2 — LOW
EGFR: 51 ML/MIN/1.73M2 — LOW
EOSINOPHIL # BLD AUTO: 0.06 K/UL — SIGNIFICANT CHANGE UP (ref 0–0.5)
EOSINOPHIL NFR BLD AUTO: 0.6 % — SIGNIFICANT CHANGE UP (ref 0–6)
EPI CELLS # UR: SIGNIFICANT CHANGE UP
GLUCOSE BLDC GLUCOMTR-MCNC: 90 MG/DL — SIGNIFICANT CHANGE UP (ref 70–99)
GLUCOSE SERPL-MCNC: 89 MG/DL — SIGNIFICANT CHANGE UP (ref 70–99)
GLUCOSE SERPL-MCNC: 96 MG/DL — SIGNIFICANT CHANGE UP (ref 70–99)
GLUCOSE UR QL: NEGATIVE MG/DL — SIGNIFICANT CHANGE UP
HCT VFR BLD CALC: 49.9 % — HIGH (ref 34.5–45)
HGB BLD-MCNC: 16 G/DL — HIGH (ref 11.5–15.5)
IMM GRANULOCYTES NFR BLD AUTO: 0.5 % — SIGNIFICANT CHANGE UP (ref 0–0.9)
KETONES UR-MCNC: ABNORMAL
LEUKOCYTE ESTERASE UR-ACNC: ABNORMAL
LYMPHOCYTES # BLD AUTO: 1.06 K/UL — SIGNIFICANT CHANGE UP (ref 1–3.3)
LYMPHOCYTES # BLD AUTO: 11.3 % — LOW (ref 13–44)
MAGNESIUM SERPL-MCNC: 2.6 MG/DL — SIGNIFICANT CHANGE UP (ref 1.6–2.6)
MAGNESIUM SERPL-MCNC: 2.7 MG/DL — HIGH (ref 1.6–2.6)
MCHC RBC-ENTMCNC: 28.3 PG — SIGNIFICANT CHANGE UP (ref 27–34)
MCHC RBC-ENTMCNC: 32.1 G/DL — SIGNIFICANT CHANGE UP (ref 32–36)
MCV RBC AUTO: 88.2 FL — SIGNIFICANT CHANGE UP (ref 80–100)
MONOCYTES # BLD AUTO: 0.7 K/UL — SIGNIFICANT CHANGE UP (ref 0–0.9)
MONOCYTES NFR BLD AUTO: 7.4 % — SIGNIFICANT CHANGE UP (ref 2–14)
NEUTROPHILS # BLD AUTO: 7.52 K/UL — HIGH (ref 1.8–7.4)
NEUTROPHILS NFR BLD AUTO: 80 % — HIGH (ref 43–77)
NITRITE UR-MCNC: NEGATIVE — SIGNIFICANT CHANGE UP
NRBC # BLD: 0 /100 WBCS — SIGNIFICANT CHANGE UP (ref 0–0)
PH UR: 6 — SIGNIFICANT CHANGE UP (ref 5–8)
PHOSPHATE SERPL-MCNC: 2.6 MG/DL — SIGNIFICANT CHANGE UP (ref 2.5–4.5)
PLATELET # BLD AUTO: 160 K/UL — SIGNIFICANT CHANGE UP (ref 150–400)
POTASSIUM SERPL-MCNC: 3.5 MMOL/L — SIGNIFICANT CHANGE UP (ref 3.5–5.3)
POTASSIUM SERPL-MCNC: 4.7 MMOL/L — SIGNIFICANT CHANGE UP (ref 3.5–5.3)
POTASSIUM SERPL-SCNC: 3.5 MMOL/L — SIGNIFICANT CHANGE UP (ref 3.5–5.3)
POTASSIUM SERPL-SCNC: 4.7 MMOL/L — SIGNIFICANT CHANGE UP (ref 3.5–5.3)
PROT SERPL-MCNC: 8.6 GM/DL — HIGH (ref 6–8.3)
PROT UR-MCNC: 15 MG/DL
RAPID RVP RESULT: SIGNIFICANT CHANGE UP
RBC # BLD: 5.66 M/UL — HIGH (ref 3.8–5.2)
RBC # FLD: 13.2 % — SIGNIFICANT CHANGE UP (ref 10.3–14.5)
RBC CASTS # UR COMP ASSIST: ABNORMAL /HPF (ref 0–4)
SARS-COV-2 RNA SPEC QL NAA+PROBE: SIGNIFICANT CHANGE UP
SODIUM SERPL-SCNC: 131 MMOL/L — LOW (ref 135–145)
SODIUM SERPL-SCNC: 135 MMOL/L — SIGNIFICANT CHANGE UP (ref 135–145)
SP GR SPEC: 1 — LOW (ref 1.01–1.02)
TROPONIN I, HIGH SENSITIVITY RESULT: 13.1 NG/L — SIGNIFICANT CHANGE UP
TROPONIN I, HIGH SENSITIVITY RESULT: 14.3 NG/L — SIGNIFICANT CHANGE UP
UROBILINOGEN FLD QL: NEGATIVE MG/DL — SIGNIFICANT CHANGE UP
WBC # BLD: 9.41 K/UL — SIGNIFICANT CHANGE UP (ref 3.8–10.5)
WBC # FLD AUTO: 9.41 K/UL — SIGNIFICANT CHANGE UP (ref 3.8–10.5)
WBC UR QL: SIGNIFICANT CHANGE UP

## 2023-03-13 PROCEDURE — 99285 EMERGENCY DEPT VISIT HI MDM: CPT

## 2023-03-13 PROCEDURE — 70450 CT HEAD/BRAIN W/O DYE: CPT | Mod: 26,MA

## 2023-03-13 PROCEDURE — 99222 1ST HOSP IP/OBS MODERATE 55: CPT

## 2023-03-13 PROCEDURE — 71045 X-RAY EXAM CHEST 1 VIEW: CPT | Mod: 26

## 2023-03-13 PROCEDURE — 93010 ELECTROCARDIOGRAM REPORT: CPT

## 2023-03-13 RX ORDER — HYDRALAZINE HCL 50 MG
5 TABLET ORAL ONCE
Refills: 0 | Status: COMPLETED | OUTPATIENT
Start: 2023-03-13 | End: 2023-03-13

## 2023-03-13 RX ORDER — SODIUM CHLORIDE 9 MG/ML
1000 INJECTION INTRAMUSCULAR; INTRAVENOUS; SUBCUTANEOUS ONCE
Refills: 0 | Status: COMPLETED | OUTPATIENT
Start: 2023-03-13 | End: 2023-03-13

## 2023-03-13 RX ADMIN — Medication 5 MILLIGRAM(S): at 22:11

## 2023-03-13 RX ADMIN — SODIUM CHLORIDE 1000 MILLILITER(S): 9 INJECTION INTRAMUSCULAR; INTRAVENOUS; SUBCUTANEOUS at 13:52

## 2023-03-13 NOTE — ED ADULT NURSE REASSESSMENT NOTE - NS ED NURSE REASSESS COMMENT FT1
Received patient in [facility] from Gabriela GARCIA. AOx4. VS noted by Dr. Tate. All needs attended.

## 2023-03-13 NOTE — H&P ADULT - HISTORY OF PRESENT ILLNESS
Pt is a pleasant 77F w/ hx of ?pulm HTN, OA, RA, HTN,  cholangitis w/ GB removal, who presents to the ED after syncopal episode. Son, Jamal benitez at bedside corroborating the story. States that for the past two days, patient has been feeling unwell, had multiple episodes of abdominal pain, nausea, vomiting, which was green in color, and with elevated blood pressure, in the 200s. Today, patient went down to the kitchen, went to make her son some hot tea, and fell unconscious. She was unconscious for 30second -1minute, per son. Subsequently, she was groggy and unable to recall events, son said she was "A&o1 and slowly started regaining herself". Son states that when he checked her blood pressure when she was done, it was 83/48 with HR 50. BP improved by the time EMS had pulled up. She denies any presyncopal episodes, other episodes of lightheadedness, and at this time denies any chest pain, shortness of breath. She does endorse feeling heartburn occasionally. Denies fevers, chills, recent travel, LE selling.

## 2023-03-13 NOTE — ED PROVIDER NOTE - PHYSICAL EXAMINATION
General appearance: Nontoxic appearing, conversant, afebrile    Eyes: anicteric sclerae, CAT, EOMI   HENT: Atraumatic; oropharynx clear, MMM and no ulcerations, no pharyngeal erythema or exudate   Neck: Trachea midline; Full range of motion, supple   Pulm: CTA bl, normal respiratory effort and no intercostal retractions, normal work of breathing   CV: RRR, No murmurs, rubs, or gallops   Abdomen: Soft, non-tender, non-distended; no guarding or rebound   Extremities: No peripheral edema, no gross deformities, FROM x4, 5/5 MS x4, gross sensation intact    Skin: Dry, normal temperature, turgor and texture; no rash   Psych: Appropriate affect, cooperative

## 2023-03-13 NOTE — ED ADULT TRIAGE NOTE - CHIEF COMPLAINT QUOTE
Patient states she got up to the use the bathroom and had a syncopal episode at home.  EMS reports 12 lead and BGL normal.  Appears dehydrated.

## 2023-03-13 NOTE — H&P ADULT - NSHPREVIEWOFSYSTEMS_GEN_ALL_CORE
CONSTITUTIONAL/GENERAL: No weakness, fevers or chills  EYES/OPHTHALMOLOGIC: No visual changes, No blurry vision, No vertigo   ENMT: No throat pain, or neck stiffness   RESPIRATORY/THORAX: No cough, wheezing, hemoptysis; No shortness of breath  CARDIOVASCULAR: No chest pain or palpitations  GASTROINTESTINAL: No abdominal or epigastric pain. No nausea, vomiting, or hematemesis; No diarrhea or constipation. No melena or hematochezia.  GENITOURINARY: No dysuria, frequency or hematuria  NEUROLOGICAL: No numbness or weakness  SKIN: No itching, rashes  ENDOCRINOLOGY: no heat intolerance, no cold intolerance, no weight gain, no weight loss  PSYCHIATRIC:  no si/no hi, mood stable, no anxiety  MUSCULOSKELETAL SYSTEM:  no joint pain, no myalgias, no arthralgias, no joint swelling

## 2023-03-13 NOTE — H&P ADULT - PROBLEM SELECTOR PLAN 1
Seems likely related to dehydration/decreased po intake from n/v vs cardiac (given extensive hx) vs neurological (given postictal state)  -ekg personally reviewed, no heart blocks. ED spoke to cards at [facility] - no role for transfer per [facility] cardiology  -obtain TTE to start  -avoid bradycardia inducing meds  -pt consult  -orthostatics pending   -consider eeg if workup is negative  -monitor on tele

## 2023-03-13 NOTE — ED PROVIDER NOTE - CLINICAL SUMMARY MEDICAL DECISION MAKING FREE TEXT BOX
78yo female with pmh htn, RA, presenting with syncope.  Last 2-3d patient has been feeling generally unwell.  Yesterday had episode of chest pain with n/v which is improved now.  Today pt had syncopal episode lasting 30 secs or so, witnessed by her son.  Plan to obtain labs, ekg, ua, trop, CT head to eval for neuro mass/ich vs. acs vs. infection vs fluid/electrolyte.  Will give fluids and monitor.

## 2023-03-13 NOTE — ED ADULT NURSE NOTE - ED STAT RN HANDOFF DETAILS
Report given to receiving tele RN Alida, pts history, current condition and reason for admission discussed, safety concerns addressed and reviewed, pt currently in stable condition, IV flushes for patency and site shows no signs or symptoms of infiltrate, dressing is clean dry and intact, pt is aware of plan of care. Pt education deemed successful at time of report after patient demonstrates successful teach back for proficiency.

## 2023-03-13 NOTE — H&P ADULT - PROBLEM SELECTOR PLAN 3
Likely pre-renal 2/2 decreased po intake  -improving  -strict Is/Os  -avoid nephrotoxic agents  -renally dose all medications

## 2023-03-13 NOTE — ED PROVIDER NOTE - PROGRESS NOTE DETAILS
SARAH Schroeder NP:  S/w cardiology/EP at [facility] regarding pt w/ bradycardia presenting w/ syncopal episode - they feel pacemaker not necessary at this time - will not accept a transfer for that.  Pt cleared by cards to be admitted to medicine.

## 2023-03-13 NOTE — ED ADULT NURSE NOTE - OBJECTIVE STATEMENT
Patient brought to ER with complaints of dizziness and syncope. Patient is alert and oriented x3, here with son at bedside who states patient fell while trying to go to the bathroom.

## 2023-03-13 NOTE — H&P ADULT - NSHPSOCIALHISTORY_GEN_ALL_CORE
Living situation: lives with son and grandchildren  Occupation: retired  Tobacco Use: denies any tobacco use  Alcohol Use: denies etoh intake  Drug use: denies marijuana, cocaine, heroine and other illicit drug use

## 2023-03-13 NOTE — ED PROVIDER NOTE - OBJECTIVE STATEMENT
78yo female with pmh htn, RA, presenting with syncope.  Last 2-3d patient has been feeling generally unwell.  Yesterday had episode of chest pain with n/v which is improved now.  This morning patient was walking to bathroom, felt lightheaded and had witnessed syncope by son.  Collapsed, with 30s loc.  No seizure like activity.  Denies pain or injuries from this.  No cp, sob, palpitations with this.  No fevers.

## 2023-03-13 NOTE — H&P ADULT - PROBLEM SELECTOR PLAN 2
maybe related to viral infectious etiology  -obtain ct a/p w/ iv contrast to r/o intra-abdominal pathology  -c/w ivf for 1 more day  -zofran PRN

## 2023-03-13 NOTE — H&P ADULT - NSHPLABSRESULTS_GEN_ALL_CORE
EKG personally reviewed: HR 51, sinus kar, qtc 446, st depression in anterior lead    CXR personally reviewed: lungs clear, no consolidation, no pleff     < from: CT Head No Cont (03.13.23 @ 13:04) >      IMPRESSION: No acute intracranial hemorrhage, mass effect, or shift of   the midline structures.    --- End of Report ---    < end of copied text >    Labs below are personally reviewed:

## 2023-03-13 NOTE — ED PROVIDER NOTE - NSICDXPASTSURGICALHX_GEN_ALL_CORE_FT
PAST SURGICAL HISTORY:  Ankle Fracture x3 surgeries  of left ankle/foot    Elbow Injury tx with internal fixation of left elbow s/p accident in 1996    H/O left mastectomy 2011    History of cholecystectomy     umbilical hernia with gallbladder removal Jan 2010

## 2023-03-13 NOTE — ED PROVIDER NOTE - NSICDXPASTMEDICALHX_GEN_ALL_CORE_FT
PAST MEDICAL HISTORY:  Acute Iritis, Right Eye     Arthritis     Autoimmune Disease s/p diovan/hydrochlorothiazide and narcotic interaction s/p umbilical hernia with gallbladder removal. Patient currently being worked up for this.    Benign Focal Childhood Epilepsy     Breast Cancer started work up in August 2010, dx in 12/2010, left mastectomy Feb 2011    Elbow fracture, left metal hardware    Hiatal Hernia gastroenteritis/ acid reflux controlled with nexium    HTN (Hypertension) 1996 dx and tx with meds    Hypothyroid     MI (myocardial infarction) 2010    Narcolepsy during her 20's to 30's hasn't been on medications for this in over 25 years    OP (Osteoporosis) low back since 1990's with osteopenia in right hip    Osteopenia R-hip    Shingles 2009

## 2023-03-13 NOTE — H&P ADULT - PROBLEM SELECTOR PLAN 4
holding atenolol at this time given bradycardia, bp better controlled in the 160s (her baseline is 140s)

## 2023-03-13 NOTE — H&P ADULT - PROBLEM SELECTOR PLAN 6
diet: dash  dvt ppx: hsq TID  dispo: pending tte and further workup    Zelda Gross MD  [facility] Division of Hospital Medicine  Available via MS Teams  Pager: [phone]

## 2023-03-13 NOTE — ED ADULT NURSE NOTE - NSIMPLEMENTINTERV_GEN_ALL_ED
Implemented All Fall Risk Interventions:  [place] to call system. Call bell, personal items and telephone within reach. Instruct patient to call for assistance. Room bathroom lighting operational. Non-slip footwear when patient is off stretcher. Physically safe environment: no spills, clutter or unnecessary equipment. Stretcher in lowest position, wheels locked, appropriate side rails in place. Provide visual cue, wrist band, yellow gown, etc. Monitor gait and stability. Monitor for mental status changes and reorient to person, place, and time. Review medications for side effects contributing to fall risk. Reinforce activity limits and safety measures with patient and family.

## 2023-03-13 NOTE — ED PROVIDER NOTE - NS ED ATTENDING STATEMENT MOD
This was a shared visit with the MELONIE. I reviewed and verified the documentation and independently performed the documented:

## 2023-03-13 NOTE — H&P ADULT - ASSESSMENT
Pt is a pleasant 77F w/ hx of ?pulm HTN, OA, RA, HTN,  cholangitis w/ GB removal, who presents to the ED after syncopal episode associated with 2 days of n/v, admitted for further workup.

## 2023-03-14 DIAGNOSIS — I10 ESSENTIAL (PRIMARY) HYPERTENSION: ICD-10-CM

## 2023-03-14 DIAGNOSIS — Z29.9 ENCOUNTER FOR PROPHYLACTIC MEASURES, UNSPECIFIED: ICD-10-CM

## 2023-03-14 DIAGNOSIS — R55 SYNCOPE AND COLLAPSE: ICD-10-CM

## 2023-03-14 DIAGNOSIS — N17.9 ACUTE KIDNEY FAILURE, UNSPECIFIED: ICD-10-CM

## 2023-03-14 DIAGNOSIS — M19.90 UNSPECIFIED OSTEOARTHRITIS, UNSPECIFIED SITE: ICD-10-CM

## 2023-03-14 DIAGNOSIS — R11.2 NAUSEA WITH VOMITING, UNSPECIFIED: ICD-10-CM

## 2023-03-14 LAB
CULTURE RESULTS: SIGNIFICANT CHANGE UP
SPECIMEN SOURCE: SIGNIFICANT CHANGE UP

## 2023-03-14 PROCEDURE — 74177 CT ABD & PELVIS W/CONTRAST: CPT | Mod: 26

## 2023-03-14 PROCEDURE — 99233 SBSQ HOSP IP/OBS HIGH 50: CPT

## 2023-03-14 PROCEDURE — 93306 TTE W/DOPPLER COMPLETE: CPT | Mod: 26

## 2023-03-14 RX ORDER — SODIUM CHLORIDE 9 MG/ML
1000 INJECTION INTRAMUSCULAR; INTRAVENOUS; SUBCUTANEOUS
Refills: 0 | Status: DISCONTINUED | OUTPATIENT
Start: 2023-03-14 | End: 2023-03-15

## 2023-03-14 RX ORDER — PANTOPRAZOLE SODIUM 20 MG/1
40 TABLET, DELAYED RELEASE ORAL
Refills: 0 | Status: DISCONTINUED | OUTPATIENT
Start: 2023-03-14 | End: 2023-03-15

## 2023-03-14 RX ORDER — LEFLUNOMIDE 10 MG/1
1 TABLET ORAL
Qty: 0 | Refills: 0 | DISCHARGE

## 2023-03-14 RX ORDER — LISINOPRIL 2.5 MG/1
5 TABLET ORAL DAILY
Refills: 0 | Status: DISCONTINUED | OUTPATIENT
Start: 2023-03-14 | End: 2023-03-15

## 2023-03-14 RX ORDER — POTASSIUM CHLORIDE 20 MEQ
20 PACKET (EA) ORAL ONCE
Refills: 0 | Status: COMPLETED | OUTPATIENT
Start: 2023-03-14 | End: 2023-03-14

## 2023-03-14 RX ORDER — ACETAMINOPHEN 500 MG
650 TABLET ORAL EVERY 6 HOURS
Refills: 0 | Status: DISCONTINUED | OUTPATIENT
Start: 2023-03-14 | End: 2023-03-15

## 2023-03-14 RX ORDER — HEPARIN SODIUM 5000 [USP'U]/ML
5000 INJECTION INTRAVENOUS; SUBCUTANEOUS EVERY 12 HOURS
Refills: 0 | Status: DISCONTINUED | OUTPATIENT
Start: 2023-03-14 | End: 2023-03-15

## 2023-03-14 RX ORDER — POTASSIUM CHLORIDE 20 MEQ
10 PACKET (EA) ORAL
Refills: 0 | Status: COMPLETED | OUTPATIENT
Start: 2023-03-14 | End: 2023-03-14

## 2023-03-14 RX ADMIN — Medication 100 MILLIEQUIVALENT(S): at 06:35

## 2023-03-14 RX ADMIN — Medication 1 MILLIGRAM(S): at 05:31

## 2023-03-14 RX ADMIN — PANTOPRAZOLE SODIUM 40 MILLIGRAM(S): 20 TABLET, DELAYED RELEASE ORAL at 06:34

## 2023-03-14 RX ADMIN — SODIUM CHLORIDE 80 MILLILITER(S): 9 INJECTION INTRAMUSCULAR; INTRAVENOUS; SUBCUTANEOUS at 12:34

## 2023-03-14 RX ADMIN — HEPARIN SODIUM 5000 UNIT(S): 5000 INJECTION INTRAVENOUS; SUBCUTANEOUS at 17:15

## 2023-03-14 RX ADMIN — Medication 100 MILLIEQUIVALENT(S): at 04:32

## 2023-03-14 RX ADMIN — HEPARIN SODIUM 5000 UNIT(S): 5000 INJECTION INTRAVENOUS; SUBCUTANEOUS at 05:31

## 2023-03-14 RX ADMIN — Medication 100 MILLIEQUIVALENT(S): at 05:31

## 2023-03-14 RX ADMIN — SODIUM CHLORIDE 80 MILLILITER(S): 9 INJECTION INTRAMUSCULAR; INTRAVENOUS; SUBCUTANEOUS at 02:33

## 2023-03-14 NOTE — PHYSICAL THERAPY INITIAL EVALUATION ADULT - ADDITIONAL COMMENTS
Pt reports she lives on the 1st floor of a house with her son. There are 3 AMARILYS B HR and son assists her with stair climbing. Pt reports she wears orthopedic shoes at home. Pt ambulates with 1 or 2 canes as needed in the house . DME: transport w/c, RW, multiple canes.

## 2023-03-14 NOTE — PROGRESS NOTE ADULT - ASSESSMENT
Pt is a pleasant 77F w/ hx of ?pulm HTN, OA, RA, HTN,  cholangitis w/ GB removal, who presents to the ED after syncopal episode associated with 2 days of n/v, admitted for further workup.       # Syncope likely due to hypotension and bradycardia.  Orthostatic vitals normal. UA negative.  Telemetry.  CT head negative.  2D Echo report pending.  Will hold her Atenolol.  Adequate hydration.  PT/OT: JANA.    # Hypotension, likely due to dehydration.  Resolved after hydration.  BP is 160/76.  Will add Lisinopril.    # Symptomatic bradycardia likely due to meds.  D/c Atenolol.  Will get TSH and FT4 level.    #SHAYLA due to dehydration.  Resolved after hydration. Cr 1.53->1.10    # Hypertension.   Stable and controlled.  Lisinopril added. D/c her Atenolol.    # H/o Rh arthritis with permanent hand/finger deformities.  On Prednisone 1 mg po daily.  Also takes Leflunomide prn per her son Mr Berry at bedside.     # DVT prophylaxis: Lovenox sq daily.    PT/OT: JANA.  Patient wants to go to home on discharge.

## 2023-03-14 NOTE — PHYSICAL THERAPY INITIAL EVALUATION ADULT - MANUAL MUSCLE TESTING RESULTS, REHAB EVAL
B UE 3/5, B LE  hip/knee 3-/5, no resistive testing performed, hx osteopenia/osteoporosis/grossly assessed due to

## 2023-03-14 NOTE — PHYSICAL THERAPY INITIAL EVALUATION ADULT - RANGE OF MOTION EXAMINATION, REHAB EVAL
grossly assessed in sitting/bilateral upper extremity ROM was WFL (within functional limits)/bilateral lower extremity ROM was WFL (within functional limits)

## 2023-03-14 NOTE — PHYSICAL THERAPY INITIAL EVALUATION ADULT - GAIT DEVIATIONS NOTED, PT EVAL
decreased ten/decreased velocity of limb motion/decreased step length/decreased stride length/decreased weight-shifting ability

## 2023-03-14 NOTE — PROGRESS NOTE ADULT - SUBJECTIVE AND OBJECTIVE BOX
Feeling better.  Denies any complaints.  Denies lightheaded or dizziness.  Son Mr Berry at bedside.        PHYSICAL EXAM:  GENERAL: NAD, lying in bed comfortably  HEAD:  Atraumatic, Normocephalic  CHEST/LUNG: Clear to auscultation bilaterally; No rales, rhonchi, wheezing, or rubs. Unlabored respirations  HEART: Regular rate and rhythm; No murmurs, rubs, or gallops  ABDOMEN: Bowel sounds present; Soft, Nontender, Nondistended. No hepatomegally  EXTREMITIES:  2+ Peripheral Pulses, brisk capillary refill. No clubbing, cyanosis, or edema  NERVOUS SYSTEM:  Alert & Oriented X3, speech clear. No deficits   MSK: FROM all 4 extremities, full and equal strength. Hand/finger deformities of rheumatoid arthritis.  SKIN: No rashes or lesions.      LABS:                        16.0   9.41  )-----------( 160      ( 13 Mar 2023 12:45 )             49.9     03-13    135  |  102  |  23  ----------------------------<  89  3.5   |  29  |  1.10    Ca    8.5      13 Mar 2023 21:26  Phos  2.6     03-13  Mg     2.6     03-13    TPro  8.6<H>  /  Alb  3.7  /  TBili  0.7  /  DBili  x   /  AST  52<H>  /  ALT  30  /  AlkPhos  102  03-13      MEDICATIONS  (STANDING):  heparin   Injectable 5000 Unit(s) SubCutaneous every 12 hours  pantoprazole    Tablet 40 milliGRAM(s) Oral before breakfast  predniSONE   Tablet 1 milliGRAM(s) Oral daily  sodium chloride 0.9%. 1000 milliLiter(s) (80 mL/Hr) IV Continuous <Continuous>      Home Medications:  atenolol 100 mg oral tablet: 1 tab(s) orally once a day (14 Mar 2023 01:09)  leflunomide 10 mg oral tablet: 1 tab(s) orally once a day, As Needed (14 Mar 2023 01:09)  predniSONE 1 mg oral tablet: 1 tab(s) orally once a day (14 Mar 2023 01:09)

## 2023-03-14 NOTE — PHYSICAL THERAPY INITIAL EVALUATION ADULT - GENERAL OBSERVATIONS, REHAB EVAL
Pt found semisupine in bed +primafit + IV +telemetry + R hand ulnar drift, A&Ox4, cooperative. PT contacted MD Ndiaye and he verbalized pt is "WBAT, no WB restrictions B LE."

## 2023-03-14 NOTE — PHYSICAL THERAPY INITIAL EVALUATION ADULT - IMPAIRMENTS CONTRIBUTING TO GAIT DEVIATIONS, PT EVAL
distance limited by c/o L hip pain with WB and resolved with rest./impaired balance/decreased flexibility/pain/decreased strength

## 2023-03-14 NOTE — PHYSICAL THERAPY INITIAL EVALUATION ADULT - PERTINENT HX OF CURRENT PROBLEM, REHAB EVAL
Pt is a pleasant 77F w/ hx of ?pulm HTN, OA, RA, HTN,  cholangitis w/ GB removal, who presents to the ED after syncopal episode. Son, Jamal benitez at bedside corroborating the story. States that for the past two days, patient has been feeling unwell, had multiple episodes of abdominal pain, nausea, vomiting, which was green in color, and with elevated blood pressure, in the 200s. Today, patient went down to the kitchen, went to make her son some hot tea, and fell unconscious. She was unconscious for 30second -1minute, per son. Subsequently, she was groggy and unable to recall events, son said she was "A&o1 and slowly started regaining herself". Son states that when he checked her blood pressure when she was done, it was 83/48 with HR 50. BP improved by the time EMS had pulled up.

## 2023-03-15 ENCOUNTER — TRANSCRIPTION ENCOUNTER (OUTPATIENT)
Age: 78
End: 2023-03-15

## 2023-03-15 VITALS
OXYGEN SATURATION: 97 % | HEART RATE: 66 BPM | RESPIRATION RATE: 18 BRPM | DIASTOLIC BLOOD PRESSURE: 75 MMHG | SYSTOLIC BLOOD PRESSURE: 125 MMHG | TEMPERATURE: 98 F

## 2023-03-15 PROCEDURE — 99239 HOSP IP/OBS DSCHRG MGMT >30: CPT

## 2023-03-15 RX ORDER — LISINOPRIL 2.5 MG/1
1 TABLET ORAL
Qty: 30 | Refills: 0
Start: 2023-03-15 | End: 2023-04-13

## 2023-03-15 RX ORDER — ATENOLOL 25 MG/1
1 TABLET ORAL
Qty: 0 | Refills: 0 | DISCHARGE

## 2023-03-15 RX ADMIN — Medication 1 MILLIGRAM(S): at 06:11

## 2023-03-15 RX ADMIN — PANTOPRAZOLE SODIUM 40 MILLIGRAM(S): 20 TABLET, DELAYED RELEASE ORAL at 06:11

## 2023-03-15 RX ADMIN — LISINOPRIL 5 MILLIGRAM(S): 2.5 TABLET ORAL at 06:11

## 2023-03-15 RX ADMIN — HEPARIN SODIUM 5000 UNIT(S): 5000 INJECTION INTRAVENOUS; SUBCUTANEOUS at 06:11

## 2023-03-15 NOTE — DISCHARGE NOTE NURSING/CASE MANAGEMENT/SOCIAL WORK - NSDCPEFALRISK_GEN_ALL_CORE
For information on Fall & Injury Prevention, visit: [email or web address] OR  [email or web address] OR  https://www.cdc.gov/steadi/patient.html

## 2023-03-15 NOTE — DISCHARGE NOTE PROVIDER - HOSPITAL COURSE
History of Present Illness:   Pt is a pleasant 77F w/ hx of ?pulm HTN, OA, RA, HTN,  cholangitis w/ GB removal, who presents to the ED after syncopal episode. Son, Jamal benitez at bedside corroborating the story. States that for the past two days, patient has been feeling unwell, had multiple episodes of abdominal pain, nausea, vomiting, which was green in color, and with elevated blood pressure, in the 200s. Today, patient went down to the kitchen, went to make her son some hot tea, and fell unconscious. She was unconscious for 30second -1minute, per son. Subsequently, she was groggy and unable to recall events, son said she was "A&o1 and slowly started regaining herself". Son states that when he checked her blood pressure when she was done, it was 83/48 with HR 50. BP improved by the time EMS had pulled up. She denies any presyncopal episodes, other episodes of lightheadedness, and at this time denies any chest pain, shortness of breath. She does endorse feeling heartburn occasionally. Denies fevers, chills, recent travel, LE selling.   Pt is a pleasant 77F w/ hx of ?pulm HTN, OA, RA, HTN,  cholangitis w/ GB removal, who presents to the ED after syncopal episode associated with 2 days of n/v, admitted for further workup.       # Syncope likely due to hypotension and bradycardia.  Orthostatic vitals normal. UA negative.  Telemetry unremarkable.  CT head negative. CT abd/pelvis: Negative for any acute finding.  2D Echo: LVEF 65-70%, no wall motion abnormality.  D/benny her Atenolol.  Adequate hydration.  PT/OT: JANA: Refused JANA, but ok with Home PT/HHA, arranged by DEDE.  Son at bedside, agreed with d/c plan and follow up with PCP.    # Hypotension, likely due to dehydration.  Resolved after hydration.  /75  D/c her Atenolol due to bradycardia.  Will add Lisinopril.    # Symptomatic bradycardia likely due to meds.  D/c Atenolol.  HR is in 60s.    #SHAYLA due to dehydration.  Resolved after hydration.   Cr 1.53->1.10    # H/o Rh arthritis with permanent hand/finger deformities.  On Prednisone 1 mg po daily.  Also takes Leflunomide prn per her son Mr Berry at bedside.

## 2023-03-15 NOTE — DISCHARGE NOTE PROVIDER - NSDCCPCAREPLAN_GEN_ALL_CORE_FT
PRINCIPAL DISCHARGE DIAGNOSIS  Diagnosis: Syncope  Assessment and Plan of Treatment:       SECONDARY DISCHARGE DIAGNOSES  Diagnosis: Nausea and vomiting  Assessment and Plan of Treatment:     Diagnosis: SHAYLA (acute kidney injury)  Assessment and Plan of Treatment:     Diagnosis: Osteoarthritis  Assessment and Plan of Treatment:     Diagnosis: RA (rheumatoid arthritis)  Assessment and Plan of Treatment:     Diagnosis: Symptomatic hypotension  Assessment and Plan of Treatment:     Diagnosis: Sinus bradycardia  Assessment and Plan of Treatment:

## 2023-03-15 NOTE — DISCHARGE NOTE PROVIDER - NSDCMRMEDTOKEN_GEN_ALL_CORE_FT
calcium-vitamin D 500 mg-200 intl units oral tablet: 1 tab(s) orally once a day  leflunomide 10 mg oral tablet: 1 tab(s) orally once a day, As Needed  lisinopril 5 mg oral tablet: 1 tab(s) orally once a day  predniSONE 1 mg oral tablet: 1 tab(s) orally once a day

## 2023-03-15 NOTE — DISCHARGE NOTE NURSING/CASE MANAGEMENT/SOCIAL WORK - PATIENT PORTAL LINK FT
You can access the FollowMyHealth Patient Portal offered by [facility] by registering at the following website: [email or web address]. By joining [employer]’s FollowMyHealth portal, you will also be able to view your health information using other applications (apps) compatible with our system.

## 2023-03-22 DIAGNOSIS — I25.2 OLD MYOCARDIAL INFARCTION: ICD-10-CM
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
## 2023-03-22 DIAGNOSIS — R00.1 BRADYCARDIA, UNSPECIFIED: ICD-10-CM

## 2023-03-22 DIAGNOSIS — N17.9 ACUTE KIDNEY FAILURE, UNSPECIFIED: ICD-10-CM

## 2023-03-22 DIAGNOSIS — Z20.822 CONTACT WITH AND (SUSPECTED) EXPOSURE TO COVID-19: ICD-10-CM

## 2023-03-22 DIAGNOSIS — E86.0 DEHYDRATION: ICD-10-CM

## 2023-03-22 DIAGNOSIS — M06.9 RHEUMATOID ARTHRITIS, UNSPECIFIED: ICD-10-CM

## 2023-03-22 DIAGNOSIS — R55 SYNCOPE AND COLLAPSE: ICD-10-CM

## 2023-03-22 DIAGNOSIS — Z90.49 ACQUIRED ABSENCE OF OTHER SPECIFIED PARTS OF DIGESTIVE TRACT: ICD-10-CM

## 2023-03-22 DIAGNOSIS — M19.90 UNSPECIFIED OSTEOARTHRITIS, UNSPECIFIED SITE: ICD-10-CM

## 2023-03-22 DIAGNOSIS — Z79.52 LONG TERM (CURRENT) USE OF SYSTEMIC STEROIDS: ICD-10-CM

## 2023-03-22 DIAGNOSIS — E03.9 HYPOTHYROIDISM, UNSPECIFIED: ICD-10-CM

## 2023-03-22 DIAGNOSIS — I10 ESSENTIAL (PRIMARY) HYPERTENSION: ICD-10-CM

## 2023-03-22 DIAGNOSIS — T44.7X5A ADVERSE EFFECT OF BETA-ADRENORECEPTOR ANTAGONISTS, INITIAL ENCOUNTER: ICD-10-CM

## 2023-03-22 DIAGNOSIS — Z90.12 ACQUIRED ABSENCE OF LEFT BREAST AND NIPPLE: ICD-10-CM

## 2023-03-22 DIAGNOSIS — I95.9 HYPOTENSION, UNSPECIFIED: ICD-10-CM

## 2023-05-28 NOTE — DISCHARGE NOTE NURSING/CASE MANAGEMENT/SOCIAL WORK - NSFLUVACAGEDISCH_IMM_ALL_CORE
As certified below, I, or a nurse practitioner or physician assistant working with me, had a face-to-face encounter that meets the physician face-to-face encounter requirements.
Adult

## 2023-08-24 NOTE — ED CLERICAL - BED REQUESTED
13-Mar-2023 19:42 Finasteride Pregnancy And Lactation Text: This medication is absolutely contraindicated during pregnancy. It is unknown if it is excreted in breast milk.

## 2024-02-07 NOTE — ED PROVIDER NOTE - WR ORDER STATUS 1
SUBJECTIVE:   Claudia Ortiz is a 68 year old female with history of prediabetes, hyperlipidemia, lumbar facet arthritis, Schizophrenia, hyperparathyroidism, HTN complaining of   Chief Complaint   Patient presents with    Follow-up   POA is activated  Accompanied by     Doing well  Appetite good - weight has increased  No GI upset  Doesn't always take her Lisinopril regularly    History of hyperparathyroidism  Calcium levels improved so it was elected to delay surgery and monitor levels instead    History of Schizophrenia  Not current seeing behavioral health and has been off medication.  Feels mood/behavioral has been overall stable.   helps with ADL's    PAST MEDICAL HISTORY:  Past Medical History:   Diagnosis Date    Essential hypertension     History of diabetes mellitus     Impaired fasting blood sugar     Schizophrenia (CMD)        FAMILY HISTORY:  Family History   Problem Relation Age of Onset    Other Mother         gallbladder    Other Father         unknown       MEDICATIONS:  Current Outpatient Medications   Medication Sig Dispense Refill    lisinopril (ZESTRIL) 5 MG tablet Take 1 tablet by mouth daily. 90 tablet 1    famotidine (PEPCID) 20 MG tablet TAKE 1 TABLET BY MOUTH TWICE DAILY AS NEEDED ACID  REFLUX 90 tablet 0    Cholecalciferol (vitamin D3) 125 mcg (5,000 units) capsule Take 1 capsule by mouth daily.      polyethylene glycol (MIRALAX) 17 g packet Take 17 g by mouth daily. Stir and dissolve powder in any 4 to 8 ounces of beverage, then drink. 30 each 0    melatonin 3 MG Take 3 mg by mouth nightly as needed. Patient reported taking prn for sleep        No current facility-administered medications for this visit.       ALLERGIES:  ALLERGIES:  No Known Allergies    SOCIAL HISTORY:  Social History     Tobacco Use    Smoking status: Never    Smokeless tobacco: Never   Vaping Use    Vaping Use: never used   Substance Use Topics    Alcohol use: No    Drug use: No       Review of Systems    Constitutional:  Negative for chills and fever.   Respiratory:  Negative for chest tightness and shortness of breath.    Cardiovascular:  Negative for chest pain and leg swelling.   Gastrointestinal:  Negative for abdominal pain.   Skin:  Negative for rash.   Psychiatric/Behavioral:  Negative for behavioral problems.        OBJECTIVE:  Visit Vitals  /70   Pulse 86   Temp 97.8 °F (36.6 °C)   Ht 5' 1\" (1.549 m)   Wt 73.6 kg (162 lb 3.2 oz)   SpO2 98%   BMI 30.65 kg/m²     Physical Exam  Constitutional:       General: She is not in acute distress.  HENT:      Head: Normocephalic and atraumatic.      Mouth/Throat:      Pharynx: Oropharynx is clear.   Eyes:      Conjunctiva/sclera: Conjunctivae normal.   Cardiovascular:      Rate and Rhythm: Normal rate and regular rhythm.      Heart sounds: No murmur heard.  Pulmonary:      Effort: Pulmonary effort is normal. No respiratory distress.      Breath sounds: Normal breath sounds. No wheezing.   Musculoskeletal:         General: No swelling.      Cervical back: Neck supple.   Skin:     General: Skin is warm and dry.   Neurological:      General: No focal deficit present.      Mental Status: She is alert.   Psychiatric:         Mood and Affect: Mood normal.         Behavior: Behavior normal.       ASSESSMENT/PLAN:  1. Essential hypertension    2. Hyperlipidemia, unspecified hyperlipidemia type    3. Impaired fasting blood sugar    4. Hyperparathyroidism (CMD)    5. Schizophrenia, unspecified type (CMD)    Comment:  BP at goal.  Triglycerides improved but remain elevated.  A1c in prediabetic range.  Calcium remains normal with mildly elevated PTH  Plan: - Reviewed labs with patient and    - Continue Lisinopril - encouraged to take more regularly   - Recommend adding Lipitor for cholesterol - see orders   - Low sugar/carb diet for prediabetes   - Calcium remains normal.  Will continue to monitor.   Had elected to defer parathyroidectomy in the past    - Repeat  labs in 6 months - see orders    - No longer seeing Psychiatry for Schizophrenia and off medication (previously on Risperidone).  Currently mood stable.   is care taker and POA which is activated    Medication discussed with patient including when to take medication and all possible side effects.  Patient verbalized understanding.  Encouraged to call or seek immediate care if develops any side effects or has any problems taking medication    Follow up in 6 months or sooner if needed    Electronically signed by: Mi Bee MD   Performed

## 2024-08-05 NOTE — ED ADULT NURSE NOTE - BREATH SOUNDS, MLM
How Severe Is Your Skin Lesion?: moderate Have Your Skin Lesions Been Treated?: not been treated Is This A New Presentation, Or A Follow-Up?: Skin Lesion Is This A New Presentation, Or A Follow-Up?: Follow Up Skin Lesions Clear

## 2024-08-13 NOTE — ED ADULT NURSE NOTE - BEFAST SPEECH SLURRED
Case Management Assessment  Initial Evaluation    Date/Time of Evaluation: 8/13/2024 2:29 PM  Assessment Completed by: Joshua Farfan RN    If patient is discharged prior to next notation, then this note serves as note for discharge by case management.    Patient Name: Cordelia Pillai                   YOB: 1955  Diagnosis: UTI (urinary tract infection) [N39.0]  Generalized weakness [R53.1]                   Date / Time: 8/12/2024  9:43 PM    Patient Admission Status: Observation   Readmission Risk (Low < 19, Mod (19-27), High > 27): No data recorded  Current PCP: Connie Franco MD  PCP verified by CM? Yes    Chart Reviewed: Yes      History Provided by: Patient  Patient Orientation: Alert and Oriented, Person, Place, Situation, Self    Patient Cognition: Alert    Hospitalization in the last 30 days (Readmission):  No    If yes, Readmission Assessment in CM Navigator will be completed.    Advance Directives:      Code Status: Full Code   Patient's Primary Decision Maker is: Legal Next of Kin    Primary Decision Maker: Kelsie Pillai - Child - [phone]    Supplemental (Other) Decision Maker: no,one - Unknown    Discharge Planning:    Patient lives with: Alone Type of Home: House  Primary Care Giver: Self  Patient Support Systems include: Children   Current Financial resources: Medicare  Current community resources: None  Current services prior to admission: None            Current DME:              Type of Home Care services:  Housekeeping (2x/month via [facility])    ADLS  Prior functional level: Independent in ADLs/IADLs, Housework (with cane mostly)  Current functional level: Assistance with the following:, Bathing, Dressing, Housework    PT AM-PAC:   /24  OT AM-PAC:   /24    Family can provide assistance at DC: No  Would you like Case Management to discuss the discharge plan with any other family members/significant others, and if so, who? No  Plans to Return to Present Housing:  Yes  Other Identified Issues/Barriers to RETURNING to current housing: none  Potential Assistance needed at discharge: N/A            Potential DME:    Patient expects to discharge to: House  Plan for transportation at discharge:      Financial    Payor: [employer]ET[employer] MEDICARE / Plan: AETNA MEDICARE ADVANTAGE HMO / Product Type: Medicare /     Does insurance require precert for SNF: Yes    Potential assistance Purchasing Medications: No  Meds-to-Beds request:        [id number] - [place], [place] - [address] - P [phone] - F [phone]  [address] [place] [address]   [facility] [address]  Phone: [phone] Fax: [phone]      Notes:    Factors facilitating achievement of predicted outcomes: Cooperative, Pleasant, Sense of humor, and Has needed Durable Medical Equipment at home    Barriers to discharge: Limited family support, No caregiver support, Decreased endurance, Upper extremity weakness, and Lower extremity weakness    Additional Case Management Notes: spoke with patient. Reported lives in modular home alone. 2 ROWAN. Has cane, walker w/c. Uses 4 point cane mostly. Has housekeeping services 2x/month via [employer]. Dtr assists with transportation. Provided BRUNER letter.Joshua Farfan RN      The Plan for Transition of Care is related to the following treatment goals of UTI (urinary tract infection) [N39.0]  Generalized weakness [R53.1]    IF APPLICABLE: The Patient and/or patient representative Cordelia and her family were provided with a choice of provider and agrees with the discharge plan. Freedom of choice list with basic dialogue that supports the patient's individualized plan of care/goals and shares the quality data associated with the providers was provided to:     Patient Representative Name:       The Patient and/or Patient Representative Agree with the Discharge Plan?      Joshua Farfan RN  Case Management Department       No

## 2025-01-20 ENCOUNTER — INPATIENT (INPATIENT)
Facility: HOSPITAL | Age: 80
LOS: 5 days | Discharge: HOME | End: 2025-01-26
Attending: INTERNAL MEDICINE | Admitting: INTERNAL MEDICINE
Payer: COMMERCIAL

## 2025-01-20 VITALS
HEIGHT: 62 IN | RESPIRATION RATE: 18 BRPM | SYSTOLIC BLOOD PRESSURE: 215 MMHG | TEMPERATURE: 98 F | WEIGHT: 102.96 LBS | OXYGEN SATURATION: 100 % | HEART RATE: 67 BPM | DIASTOLIC BLOOD PRESSURE: 102 MMHG

## 2025-01-20 DIAGNOSIS — Z90.12 ACQUIRED ABSENCE OF LEFT BREAST AND NIPPLE: Chronic | ICD-10-CM

## 2025-01-20 DIAGNOSIS — Z90.49 ACQUIRED ABSENCE OF OTHER SPECIFIED PARTS OF DIGESTIVE TRACT: Chronic | ICD-10-CM

## 2025-01-20 LAB
ALBUMIN SERPL ELPH-MCNC: 3.9 G/DL — SIGNIFICANT CHANGE UP (ref 3.3–5)
ALP SERPL-CCNC: 92 U/L — SIGNIFICANT CHANGE UP (ref 40–120)
ALT FLD-CCNC: 12 U/L — SIGNIFICANT CHANGE UP (ref 12–78)
ANION GAP SERPL CALC-SCNC: 5 MMOL/L — SIGNIFICANT CHANGE UP (ref 5–17)
APPEARANCE UR: CLEAR — SIGNIFICANT CHANGE UP
AST SERPL-CCNC: 25 U/L — SIGNIFICANT CHANGE UP (ref 15–37)
BACTERIA # UR AUTO: NEGATIVE /HPF — SIGNIFICANT CHANGE UP
BASOPHILS # BLD AUTO: 0.02 K/UL — SIGNIFICANT CHANGE UP (ref 0–0.2)
BASOPHILS NFR BLD AUTO: 0.3 % — SIGNIFICANT CHANGE UP (ref 0–2)
BILIRUB SERPL-MCNC: 0.4 MG/DL — SIGNIFICANT CHANGE UP (ref 0.2–1.2)
BILIRUB UR-MCNC: NEGATIVE — SIGNIFICANT CHANGE UP
BLD GP AB SCN SERPL QL: SIGNIFICANT CHANGE UP
BUN SERPL-MCNC: 12 MG/DL — SIGNIFICANT CHANGE UP (ref 7–23)
CALCIUM SERPL-MCNC: 9 MG/DL — SIGNIFICANT CHANGE UP (ref 8.5–10.1)
CHLORIDE SERPL-SCNC: 100 MMOL/L — SIGNIFICANT CHANGE UP (ref 96–108)
CO2 SERPL-SCNC: 28 MMOL/L — SIGNIFICANT CHANGE UP (ref 22–31)
COLOR SPEC: YELLOW — SIGNIFICANT CHANGE UP
COMMENT - URINE: SIGNIFICANT CHANGE UP
CREAT SERPL-MCNC: 0.93 MG/DL — SIGNIFICANT CHANGE UP (ref 0.5–1.3)
DIFF PNL FLD: NEGATIVE — SIGNIFICANT CHANGE UP
EGFR: 62 ML/MIN/1.73M2 — SIGNIFICANT CHANGE UP
EOSINOPHIL # BLD AUTO: 0.01 K/UL — SIGNIFICANT CHANGE UP (ref 0–0.5)
EOSINOPHIL NFR BLD AUTO: 0.1 % — SIGNIFICANT CHANGE UP (ref 0–6)
EPI CELLS # UR: PRESENT
GLUCOSE SERPL-MCNC: 168 MG/DL — HIGH (ref 70–99)
GLUCOSE UR QL: 100 MG/DL
HCT VFR BLD CALC: 42.3 % — SIGNIFICANT CHANGE UP (ref 34.5–45)
HGB BLD-MCNC: 13.4 G/DL — SIGNIFICANT CHANGE UP (ref 11.5–15.5)
IMM GRANULOCYTES NFR BLD AUTO: 0.1 % — SIGNIFICANT CHANGE UP (ref 0–0.9)
KETONES UR-MCNC: ABNORMAL MG/DL
LACTATE SERPL-SCNC: 1.8 MMOL/L — SIGNIFICANT CHANGE UP (ref 0.7–2)
LEUKOCYTE ESTERASE UR-ACNC: NEGATIVE — SIGNIFICANT CHANGE UP
LIDOCAIN IGE QN: 36 U/L — SIGNIFICANT CHANGE UP (ref 13–75)
LYMPHOCYTES # BLD AUTO: 0.54 K/UL — LOW (ref 1–3.3)
LYMPHOCYTES # BLD AUTO: 7.2 % — LOW (ref 13–44)
MCHC RBC-ENTMCNC: 28.3 PG — SIGNIFICANT CHANGE UP (ref 27–34)
MCHC RBC-ENTMCNC: 31.7 G/DL — LOW (ref 32–36)
MCV RBC AUTO: 89.2 FL — SIGNIFICANT CHANGE UP (ref 80–100)
MONOCYTES # BLD AUTO: 0.28 K/UL — SIGNIFICANT CHANGE UP (ref 0–0.9)
MONOCYTES NFR BLD AUTO: 3.8 % — SIGNIFICANT CHANGE UP (ref 2–14)
NEUTROPHILS # BLD AUTO: 6.59 K/UL — SIGNIFICANT CHANGE UP (ref 1.8–7.4)
NEUTROPHILS NFR BLD AUTO: 88.5 % — HIGH (ref 43–77)
NITRITE UR-MCNC: NEGATIVE — SIGNIFICANT CHANGE UP
NRBC # BLD: 0 /100 WBCS — SIGNIFICANT CHANGE UP (ref 0–0)
NRBC BLD-RTO: 0 /100 WBCS — SIGNIFICANT CHANGE UP (ref 0–0)
PH UR: 8 — SIGNIFICANT CHANGE UP (ref 5–8)
PLATELET # BLD AUTO: 180 K/UL — SIGNIFICANT CHANGE UP (ref 150–400)
POTASSIUM SERPL-MCNC: 4 MMOL/L — SIGNIFICANT CHANGE UP (ref 3.5–5.3)
POTASSIUM SERPL-SCNC: 4 MMOL/L — SIGNIFICANT CHANGE UP (ref 3.5–5.3)
PROT SERPL-MCNC: 8.7 GM/DL — HIGH (ref 6–8.3)
PROT UR-MCNC: 30 MG/DL
RBC # BLD: 4.74 M/UL — SIGNIFICANT CHANGE UP (ref 3.8–5.2)
RBC # FLD: 12.9 % — SIGNIFICANT CHANGE UP (ref 10.3–14.5)
RBC CASTS # UR COMP ASSIST: 0 /HPF — SIGNIFICANT CHANGE UP (ref 0–4)
SODIUM SERPL-SCNC: 133 MMOL/L — LOW (ref 135–145)
SP GR SPEC: 1.01 — SIGNIFICANT CHANGE UP (ref 1–1.03)
TROPONIN I, HIGH SENSITIVITY RESULT: 9.3 NG/L — SIGNIFICANT CHANGE UP
UROBILINOGEN FLD QL: 0.2 MG/DL — SIGNIFICANT CHANGE UP (ref 0.2–1)
WBC # BLD: 7.45 K/UL — SIGNIFICANT CHANGE UP (ref 3.8–10.5)
WBC # FLD AUTO: 7.45 K/UL — SIGNIFICANT CHANGE UP (ref 3.8–10.5)
WBC UR QL: 1 /HPF — SIGNIFICANT CHANGE UP (ref 0–5)

## 2025-01-20 PROCEDURE — 99222 1ST HOSP IP/OBS MODERATE 55: CPT

## 2025-01-20 PROCEDURE — 99223 1ST HOSP IP/OBS HIGH 75: CPT

## 2025-01-20 PROCEDURE — 70450 CT HEAD/BRAIN W/O DYE: CPT | Mod: 26

## 2025-01-20 PROCEDURE — 99285 EMERGENCY DEPT VISIT HI MDM: CPT

## 2025-01-20 PROCEDURE — 74177 CT ABD & PELVIS W/CONTRAST: CPT | Mod: 26

## 2025-01-20 PROCEDURE — 71045 X-RAY EXAM CHEST 1 VIEW: CPT | Mod: 26

## 2025-01-20 PROCEDURE — 93010 ELECTROCARDIOGRAM REPORT: CPT

## 2025-01-20 RX ORDER — FAMOTIDINE 10 MG/ML
20 INJECTION INTRAVENOUS ONCE
Refills: 0 | Status: COMPLETED | OUTPATIENT
Start: 2025-01-20 | End: 2025-01-20

## 2025-01-20 RX ORDER — ATORVASTATIN CALCIUM 80 MG/1
80 TABLET, FILM COATED ORAL AT BEDTIME
Refills: 0 | Status: DISCONTINUED | OUTPATIENT
Start: 2025-01-20 | End: 2025-01-26

## 2025-01-20 RX ORDER — IOHEXOL 350 MG/ML
30 INJECTION, SOLUTION INTRAVENOUS ONCE
Refills: 0 | Status: COMPLETED | OUTPATIENT
Start: 2025-01-20 | End: 2025-01-20

## 2025-01-20 RX ORDER — HYDRALAZINE HCL 100 MG
10 TABLET ORAL ONCE
Refills: 0 | Status: COMPLETED | OUTPATIENT
Start: 2025-01-20 | End: 2025-01-20

## 2025-01-20 RX ORDER — HEPARIN SODIUM,PORCINE 10000/ML
5000 VIAL (ML) INJECTION EVERY 12 HOURS
Refills: 0 | Status: DISCONTINUED | OUTPATIENT
Start: 2025-01-20 | End: 2025-01-26

## 2025-01-20 RX ORDER — ASPIRIN 81 MG/1
81 TABLET, COATED ORAL DAILY
Refills: 0 | Status: DISCONTINUED | OUTPATIENT
Start: 2025-01-20 | End: 2025-01-25

## 2025-01-20 RX ORDER — PREDNISONE 5 MG/1
1 TABLET ORAL DAILY
Refills: 0 | Status: DISCONTINUED | OUTPATIENT
Start: 2025-01-20 | End: 2025-01-26

## 2025-01-20 RX ORDER — ONDANSETRON 4 MG/1
4 TABLET, ORALLY DISINTEGRATING ORAL ONCE
Refills: 0 | Status: COMPLETED | OUTPATIENT
Start: 2025-01-20 | End: 2025-01-20

## 2025-01-20 RX ORDER — SODIUM CHLORIDE 9 G/ML
1000 INJECTION, SOLUTION INTRAVENOUS
Refills: 0 | Status: DISCONTINUED | OUTPATIENT
Start: 2025-01-20 | End: 2025-01-21

## 2025-01-20 RX ORDER — ACETAMINOPHEN 160 MG/5ML
975 SUSPENSION ORAL ONCE
Refills: 0 | Status: COMPLETED | OUTPATIENT
Start: 2025-01-20 | End: 2025-01-20

## 2025-01-20 RX ORDER — METOPROLOL SUCCINATE 25 MG
25 TABLET, EXTENDED RELEASE 24 HR ORAL DAILY
Refills: 0 | Status: DISCONTINUED | OUTPATIENT
Start: 2025-01-20 | End: 2025-01-26

## 2025-01-20 RX ADMIN — Medication 5 MILLIGRAM(S): at 14:56

## 2025-01-20 RX ADMIN — Medication 10 MILLIGRAM(S): at 05:49

## 2025-01-20 RX ADMIN — Medication 25 MILLIGRAM(S): at 14:55

## 2025-01-20 RX ADMIN — Medication 5000 UNIT(S): at 17:09

## 2025-01-20 RX ADMIN — IOHEXOL 30 MILLILITER(S): 350 INJECTION, SOLUTION INTRAVENOUS at 05:49

## 2025-01-20 RX ADMIN — Medication 10 MILLIGRAM(S): at 16:39

## 2025-01-20 RX ADMIN — ONDANSETRON 4 MILLIGRAM(S): 4 TABLET, ORALLY DISINTEGRATING ORAL at 05:49

## 2025-01-20 RX ADMIN — FAMOTIDINE 20 MILLIGRAM(S): 10 INJECTION INTRAVENOUS at 05:49

## 2025-01-20 NOTE — ED PROVIDER NOTE - CLINICAL SUMMARY MEDICAL DECISION MAKING FREE TEXT BOX
80 yo F with hypertension and pain in multiple areas concerning for ischemia, acs  -cbc, cmp, type and screen, blood cx, lactate, lipase, trop, CT brain, cxr, ab/pel, ekg, iv

## 2025-01-20 NOTE — CONSULT NOTE ADULT - SUBJECTIVE AND OBJECTIVE BOX
HPI:  Pt seen in ER with son, Larry, present  He states his brother, Jamal lives w patient and has noticed fluctuating blood pressure readings over past several days  Pt c/o multiple symptoms including feeling overall unwell, with headaches and vague upper abdominal pain over the past few days as well as left hip pain. Associated nausea and several episodes of vomiting.  She states she had gallbladder removed in  and also had gallstones removed.    PAST MEDICAL & SURGICAL HISTORY:  Benign Focal Childhood Epilepsy      Narcolepsy  during her 20's to 30's hasn't been on medications for this in over 25 years      Shingles        Hiatal Hernia  gastroenteritis/ acid reflux controlled with nexium      Autoimmune Disease  s/p diovan/hydrochlorothiazide and narcotic interaction s/p umbilical hernia with gallbladder removal. Patient currently being worked up for this.      OP (Osteoporosis)  low back since  with osteopenia in right hip      Breast Cancer  started work up in 2010, dx in 2010, left mastectomy 2011      HTN (Hypertension)   dx and tx with meds      Acute Iritis, Right Eye      Arthritis      Hypothyroid      Osteopenia  R-hip      Elbow fracture, left  metal hardware      MI (myocardial infarction)        umbilical hernia  with gallbladder removal 2010      Elbow Injury  tx with internal fixation of left elbow s/p accident in       Ankle Fracture  x3 surgeries  of left ankle/foot      H/O left mastectomy        History of cholecystectomy      Review of Systems:  Negative except  as above in HPI    Allergies  erythromycin (Other; Vomiting; Nausea)  Diovan (Joint Pain; Muscle Pain)  Orange (Hives; Rash)  Premarin (Other)  Ensure (Pruritus)  Tomatoes (Pruritus; Rash)  hydrochlorothiazide (Headache; Joint Pain)  penicillin (Angioedema; Swelling; Hives)  Miacalcin (Pruritus)  Evista (Swelling)  methotrexate (Other)  Enalapril Maleate (Muscle Pain)  Lozol (Other)  shannon greens (Rash; Diarrhea; Pruritus; Hives)  corn (Rash; Diarrhea; Hives)  atenolol (Hives)    Intolerances  apple (Rash)      SOCIAL HISTORY lives with son, retired.          FAMILY HISTORY:  No pertinent family history in first degree relatives      Vital Signs Last 24 Hrs  T(C): 37 (2025 11:23), Max: 37 (2025 11:23)  T(F): 98.6 (2025 11:23), Max: 98.6 (2025 11:23)  HR: 71 (2025 11:23) (64 - 71)  BP: 183/85 (2025 11:23) (178/90 - 215/102)  BP(mean): --  RR: 23 (2025 11:23) (18 - 23)  SpO2: 100% (2025 11:23) (100% - 100%)    Parameters below as of 2025 11:23  Patient On (Oxygen Delivery Method): room air      Physical Exam:  General:  Appears stated age, well-groomed, no distress  Eyes: EOMI, conjunctiva clear  HENT:  WNL, no JVD  Chest:  clear breath sounds  Cardiovascular: +S1S2  Abdomen:  no obvious scars. Mild tenderness to palpation RUQ and epigastric area. No guarding. No rebound. Soft, nondistedned  Extremities: no calf tenderness  Skin: No rash  Neuro/Psych: Alert, oriented to time, place and person     LABS:                        13.4   7.45  )-----------( 180      ( 2025 05:58 )             42.3     -20    133[L]  |  100  |  12  ----------------------------<  168[H]  4.0   |  28  |  0.93    Ca    9.0      2025 05:58    TPro  8.7[H]  /  Alb  3.9  /  TBili  0.4  /  DBili  x   /  AST  25  /  ALT  12  /  AlkPhos  92  -      Urinalysis Basic - ( 2025 05:59 )    Color: Yellow / Appearance: Clear / S.013 / pH: x  Gluc: x / Ketone: Trace mg/dL  / Bili: Negative / Urobili: 0.2 mg/dL   Blood: x / Protein: 30 mg/dL / Nitrite: Negative   Leuk Esterase: Negative / RBC: 0 /HPF / WBC 1 /HPF   Sq Epi: x / Non Sq Epi: x / Bacteria: Negative /HPF        RADIOLOGY & ADDITIONAL STUDIES:    < from: CT Abdomen and Pelvis w/ Oral Cont and w/ IV Cont (25 @ 08:53) >  BILE DUCTS: Mild intrahepatic biliary duct dilatation with pneumobilia.   The CBD is normal caliber.  GALLBLADDER: Cholecystectomy with retained stones again noted in the   remnant.    < end of copied text >      Impression/Plan: 78yo female admitted with acute stroke seen with CT findings of pneumobilia and retained stones in GB remnant     PMH per chart: hiatal hernia, OA, osteoporosis, Hypothyroid, CAD post MI, HTN, breast CA s/p L mastectomy ,  Psxh cholecystectomy and umbilical henia repair, CBD obstruction and cholangitis requiring stent and stone extraction in  and 2018 with Dr Simmons Onu.  afebrile with no leukocytosis. LFTs wnl.  as discussed with Dr Gutierrez, recommend GI evaluation  IVF hydration, antiemetic prn. Diet as tolerated  No surgical intervention indicated at present time  recommend medical management HPI:  Pt seen in ER with son, Larry, present  He states his brother, Jamal lives w patient and has noticed fluctuating blood pressure readings over past several days  Pt c/o multiple symptoms including feeling overall unwell, with headaches and vague upper abdominal pain over the past few days as well as left hip pain. Associated nausea and several episodes of vomiting.  She states she had gallbladder removed in  and also had gallstones removed.    PAST MEDICAL & SURGICAL HISTORY:  Benign Focal Childhood Epilepsy      Narcolepsy  during her 20's to 30's hasn't been on medications for this in over 25 years      Shingles        Hiatal Hernia  gastroenteritis/ acid reflux controlled with nexium      Autoimmune Disease  s/p diovan/hydrochlorothiazide and narcotic interaction s/p umbilical hernia with gallbladder removal. Patient currently being worked up for this.      OP (Osteoporosis)  low back since  with osteopenia in right hip      Breast Cancer  started work up in 2010, dx in 2010, left mastectomy 2011      HTN (Hypertension)   dx and tx with meds      Acute Iritis, Right Eye      Arthritis      Hypothyroid      Osteopenia  R-hip      Elbow fracture, left  metal hardware      MI (myocardial infarction)        umbilical hernia  with gallbladder removal 2010      Elbow Injury  tx with internal fixation of left elbow s/p accident in       Ankle Fracture  x3 surgeries  of left ankle/foot      H/O left mastectomy        History of cholecystectomy      Review of Systems:  Negative except  as above in HPI    Allergies  erythromycin (Other; Vomiting; Nausea)  Diovan (Joint Pain; Muscle Pain)  Orange (Hives; Rash)  Premarin (Other)  Ensure (Pruritus)  Tomatoes (Pruritus; Rash)  hydrochlorothiazide (Headache; Joint Pain)  penicillin (Angioedema; Swelling; Hives)  Miacalcin (Pruritus)  Evista (Swelling)  methotrexate (Other)  Enalapril Maleate (Muscle Pain)  Lozol (Other)  shannon greens (Rash; Diarrhea; Pruritus; Hives)  corn (Rash; Diarrhea; Hives)  atenolol (Hives)    Intolerances  apple (Rash)      SOCIAL HISTORY lives with son, retired.          FAMILY HISTORY:  No pertinent family history in first degree relatives      Vital Signs Last 24 Hrs  T(C): 37 (2025 11:23), Max: 37 (2025 11:23)  T(F): 98.6 (2025 11:23), Max: 98.6 (2025 11:23)  HR: 71 (2025 11:23) (64 - 71)  BP: 183/85 (2025 11:23) (178/90 - 215/102)  BP(mean): --  RR: 23 (2025 11:23) (18 - 23)  SpO2: 100% (2025 11:23) (100% - 100%)    Parameters below as of 2025 11:23  Patient On (Oxygen Delivery Method): room air      Physical Exam:  General:  Appears stated age, well-groomed, no distress  Eyes: EOMI, conjunctiva clear  HENT:  WNL, no JVD  Chest:  clear breath sounds  Cardiovascular: +S1S2  Abdomen:  no obvious scars. Mild tenderness to palpation RUQ and epigastric area. No guarding. No rebound. Soft, nondistedned  Extremities: no calf tenderness  Skin: No rash  Neuro/Psych: Alert, oriented to time, place and person     LABS:                        13.4   7.45  )-----------( 180      ( 2025 05:58 )             42.3     -20    133[L]  |  100  |  12  ----------------------------<  168[H]  4.0   |  28  |  0.93    Ca    9.0      2025 05:58    TPro  8.7[H]  /  Alb  3.9  /  TBili  0.4  /  DBili  x   /  AST  25  /  ALT  12  /  AlkPhos  92  -      Urinalysis Basic - ( 2025 05:59 )    Color: Yellow / Appearance: Clear / S.013 / pH: x  Gluc: x / Ketone: Trace mg/dL  / Bili: Negative / Urobili: 0.2 mg/dL   Blood: x / Protein: 30 mg/dL / Nitrite: Negative   Leuk Esterase: Negative / RBC: 0 /HPF / WBC 1 /HPF   Sq Epi: x / Non Sq Epi: x / Bacteria: Negative /HPF        RADIOLOGY & ADDITIONAL STUDIES:    < from: CT Abdomen and Pelvis w/ Oral Cont and w/ IV Cont (25 @ 08:53) >  BILE DUCTS: Mild intrahepatic biliary duct dilatation with pneumobilia.   The CBD is normal caliber.  GALLBLADDER: Cholecystectomy with retained stones again noted in the   remnant.    < end of copied text >      Impression/Plan: 78yo female admitted with acute stroke seen with CT findings of pneumobilia and retained stones in GB remnant     PMH per chart: hiatal hernia, OA, osteoporosis, Hypothyroid, CAD post MI, HTN, breast CA s/p L mastectomy ,  Psxh cholecystectomy and umbilical henia repair, CBD obstruction and cholangitis requiring stent and stone extraction in  and 2018 with Dr Simmons Onu.  afebrile with no leukocytosis. LFTs wnl.  as discussed with Dr Gutierrez, recommend GI evaluation, consider MRCP for further evaluation  IVF hydration, antiemetic prn. Diet as tolerated  No surgical intervention indicated at present time  recommend medical management

## 2025-01-20 NOTE — ED ADULT NURSE NOTE - OBJECTIVE STATEMENT
Pt is a79y F AOX4 with a pmh of left mastectomy, HTN, HLD, acid reflux , and osteopenia. Pt reports " pressure in the head", epigastric abdominal pain  and increased urinary frequency for 1 week. Pt also reports multiple episodes fo vomit since 11pm, lightheadedness, dizziness and right sided chest pain

## 2025-01-20 NOTE — ED PROVIDER NOTE - OBJECTIVE STATEMENT
80 yo F with hypertension and headaches all week.  Son provides most of history at bedside, states mom's pressure is difficulty to control at baseline due to many allergies.  Pt. has R breast and epigastric abd pain as well for 1 day.  No recent trauma/inciting event.  Pt. did throw up a few times today as well.   ROS: negative for fever, cough, shortness of breath, diarrhea, rash, paresthesia, and focal weakness--all other systems reviewed are negative.   PMH: HTN, OA, RA, HTN,  cholangitis w/ GB removal; Meds: See EMR for list; SH: Denies smoking/drinking/drug use

## 2025-01-20 NOTE — CONSULT NOTE ADULT - SUBJECTIVE AND OBJECTIVE BOX
Neurology consult    SOBEIDA [id number]     Patient is a 79y old  Female who presents with a chief complaint of abd pain (2025 15:05)      HPI:   78 yo F      h/o    HTN /  RA on  prednisone,   s/p  cholangitis/  cholecystectomy           h/o epigastric abd pain as well for 1 day.   with  n/  vomiting       denies  fever, cough, shortness of breath, diarrhea, rash, paresthes    seen in  er.  ha s no  abd  pain  now     son at   bedside    (2025 13:29)      no difficulty with language.  No vision loss or double vision.  No dizziness, vertigo or new hearing loss.  . No difficulty with swallowing.  No focal weakness.  No focal sensory changes.  No numbness or tingling in the bilateral lower extremities.  No difficulty with balance.  No difficulty with ambulation.    REVIEW OF SYSTEMS:    Constitutional: No fever, chills, fatigue, weakness  Eyes: no eye pain, visual disturbances, or discharge  ENT:  No difficulty hearing, tinnitus, vertigo; No sinus or throat pain  Neck: No pain or stiffness  Respiratory: No cough, dyspnea, wheezing   Cardiovascular: No chest pain, palpitations,   Gastrointestinal: No abdominal or epigastric pain. No nausea, vomiting  No diarrhea or constipation.   Genitourinary: No dysuria, frequency, hematuria or incontinence  Neurological: No headaches, lightheadedness, vertigo, numbness or tremors  Psychiatric: No depression, anxiety, mood swings or difficulty sleeping  Musculoskeletal: No joint pain or swelling; No muscle, back or extremity pain  Skin: No itching, burning, rashes or lesions   Lymph Nodes: No enlarged glands  Endocrine: No heat or cold intolerance; No hair loss, No h/o diabetes or thyroid dysfunction  Allergy and Immunologic: No hives or eczema    MEDICATIONS    aspirin enteric coated 81 milliGRAM(s) Oral daily  atorvastatin 80 milliGRAM(s) Oral at bedtime  clopidogrel Tablet 75 milliGRAM(s) Oral daily  heparin   Injectable 5000 Unit(s) SubCutaneous every 12 hours  lisinopril 5 milliGRAM(s) Oral daily  metoprolol succinate ER 25 milliGRAM(s) Oral daily  predniSONE   Tablet 1 milliGRAM(s) Oral daily      PMH: Benign Focal Childhood Epilepsy    Narcolepsy    Shingles    Hiatal Hernia    Autoimmune Disease    OP (Osteoporosis)    Breast Cancer    HTN (Hypertension)    Acute Iritis, Right Eye    Arthritis    Hypothyroid    Osteopenia    Elbow fracture, left    MI (myocardial infarction)         PSH: umbilical hernia    Elbow Injury    Ankle Fracture    H/O left mastectomy    History of cholecystectomy        Family history: No history of dementia, strokes, or seizures   FAMILY HISTORY:  No pertinent family history in first degree relatives        SOCIAL HISTORY:  No history of tobacco or alcohol use     Allergies    erythromycin (Other; Vomiting; Nausea)  Diovan (Joint Pain; Muscle Pain)  Orange (Hives; Rash)  Premarin (Other)  Ensure (Pruritus)  Tomatoes (Pruritus; Rash)  hydrochlorothiazide (Headache; Joint Pain)  penicillin (Angioedema; Swelling; Hives)  Miacalcin (Pruritus)  Evista (Swelling)  methotrexate (Other)  Enalapril Maleate (Muscle Pain)  Lozol (Other)  shannon greens (Rash; Diarrhea; Pruritus; Hives)  corn (Rash; Diarrhea; Hives)  atenolol (Hives)    Intolerances    apple (Rash)      Height (cm): 157.5 ( @ 04:17)  Weight (kg): 46.7 ( @ 04:17)  BMI (kg/m2): 18.8 ( @ 04:17)    Vital Signs Last 24 Hrs  T(C): 37.2 (2025 16:07), Max: 37.2 (2025 16:07)  T(F): 99 (2025 16:07), Max: 99 (2025 16:07)  HR: 79 (2025 14:54) (64 - 79)  BP: 211/88 (2025 16:07) (178/90 - 215/102)  BP(mean): --  RR: 14 (2025 16:07) (14 - 23)  SpO2: 100% (2025 16:07) (100% - 100%)    Parameters below as of 2025 16:07  Patient On (Oxygen Delivery Method): room air          On Neurological Examination:    Neuro: AAOx3; knows age, month, obeys commands, no dysarthria; calculations intact, fluent names, repeats     CN: PERRL, EOMI, VFF normal gaze preference, no facial palsy,     Motor: bilaterally LE some effort    Sensory: Intact to LT and PP no extinction    Coordination: FTN intact b/l            GENERAL Exam:     Nontoxic , No Acute Distress   	  HEENT:  normocephalic, atraumatic  		  LUNGS:	Clear bilaterally  No Wheeze    	  HEART:	 Normal S1S2   No murmur RRR        	  GI/ ABDOMEN:  Soft  Non tender    EXTREMITIES:   No Edema  No Clubbing  No Cyanosis No Edema    MUSCULOSKELETAL: Normal Range of Motion  	   SKIN:      Normal   No Ecchymosis               LABS:  CBC Full  -  ( 2025 05:58 )  WBC Count : 7.45 K/uL  RBC Count : 4.74 M/uL  Hemoglobin : 13.4 g/dL  Hematocrit : 42.3 %  Platelet Count - Automated : 180 K/uL  Mean Cell Volume : 89.2 fl  Mean Cell Hemoglobin : 28.3 pg  Mean Cell Hemoglobin Concentration : 31.7 g/dL  Auto Neutrophil # : 6.59 K/uL  Auto Lymphocyte # : 0.54 K/uL  Auto Monocyte # : 0.28 K/uL  Auto Eosinophil # : 0.01 K/uL  Auto Basophil # : 0.02 K/uL  Auto Neutrophil % : 88.5 %  Auto Lymphocyte % : 7.2 %  Auto Monocyte % : 3.8 %  Auto Eosinophil % : 0.1 %  Auto Basophil % : 0.3 %    Urinalysis Basic - ( 2025 05:59 )    Color: Yellow / Appearance: Clear / S.013 / pH: x  Gluc: x / Ketone: Trace mg/dL  / Bili: Negative / Urobili: 0.2 mg/dL   Blood: x / Protein: 30 mg/dL / Nitrite: Negative   Leuk Esterase: Negative / RBC: 0 /HPF / WBC 1 /HPF   Sq Epi: x / Non Sq Epi: x / Bacteria: Negative /HPF          133[L]  |  100  |  12  ----------------------------<  168[H]  4.0   |  28  |  0.93    Ca    9.0      2025 05:58    TPro  8.7[H]  /  Alb  3.9  /  TBili  0.4  /  DBili  x   /  AST  25  /  ALT  12  /  AlkPhos  92      LIVER FUNCTIONS - ( 2025 05:58 )  Alb: 3.9 g/dL / Pro: 8.7 gm/dL / ALK PHOS: 92 U/L / ALT: 12 U/L / AST: 25 U/L / GGT: x           Hemoglobin A1C:             RADIOLOGY  < from: CT Head No Cont (25 @ 08:53) >  Impression: Acute left MCA infarct cannot be entirely excluded as   described above.    < end of copied text >

## 2025-01-20 NOTE — CONSULT NOTE ADULT - SUBJECTIVE AND OBJECTIVE BOX
Cardiology Initial Consult    [facility] - Cardiology at [place]  [address] [place] [address], [place] [address]  Office: [phone]  Fax: [phone]    CHIEF COMPLAINT:  abd pain    HISTORY OF PRESENT ILLNESS:  79F HTN, RA who presented with headache and abd pain/N/V, found to have acute MCA CVA on CT    Allergies    erythromycin (Other; Vomiting; Nausea)  Diovan (Joint Pain; Muscle Pain)  Orange (Hives; Rash)  Premarin (Other)  Ensure (Pruritus)  Tomatoes (Pruritus; Rash)  hydrochlorothiazide (Headache; Joint Pain)  penicillin (Angioedema; Swelling; Hives)  Miacalcin (Pruritus)  Evista (Swelling)  methotrexate (Other)  Enalapril Maleate (Muscle Pain)  Lozol (Other)  shannon greens (Rash; Diarrhea; Pruritus; Hives)  corn (Rash; Diarrhea; Hives)  atenolol (Hives)    Intolerances    apple (Rash)  	    MEDICATIONS:  aspirin enteric coated 81 milliGRAM(s) Oral daily  clopidogrel Tablet 75 milliGRAM(s) Oral daily  heparin   Injectable 5000 Unit(s) SubCutaneous every 12 hours  lisinopril 5 milliGRAM(s) Oral daily  metoprolol succinate ER 25 milliGRAM(s) Oral daily  atorvastatin 80 milliGRAM(s) Oral at bedtime  predniSONE   Tablet 1 milliGRAM(s) Oral daily    PAST MEDICAL & SURGICAL HISTORY:  Benign Focal Childhood Epilepsy  Narcolepsy  during her 20's to 30's hasn't been on medications for this in over 25 years  Shingles  2009  Hiatal Hernia  gastroenteritis/ acid reflux controlled with nexium  Autoimmune Disease  s/p diovan/hydrochlorothiazide and narcotic interaction s/p umbilical hernia with gallbladder removal. Patient currently being worked up for this.  OP (Osteoporosis)  low back since 1990's with osteopenia in right hip  Breast Cancer  started work up in August 2010, dx in 12/2010, left mastectomy Feb 2011  HTN (Hypertension)  1996 dx and tx with meds  Acute Iritis, Right Eye  Arthritis  Hypothyroid  Osteopenia  R-hip  Elbow fracture, left  metal hardware  MI (myocardial infarction)  2010  umbilical hernia  with gallbladder removal Jan 2010  Elbow Injury  tx with internal fixation of left elbow s/p accident in 1996  Ankle Fracture  x3 surgeries  of left ankle/foot  H/O left mastectomy  2011  History of cholecystectomy    FAMILY HISTORY:  No pertinent family history in first degree relatives        SOCIAL HISTORY:    [ ] Non-smoker  [ ] Smoker  [ ] Alcohol    Review of Systems:  Constitutional: [ -] Fever [- ] Chills [ ] Fatigue [ ] Weight change   HEENT: [ ] Blurred vision [ -] Eye pain [ ] Headache [ ] Runny nose [ ] Sore throat   Respiratory: [- ] Cough [ ] Wheezing [ ] Shortness of breath  Cardiovascular: [ -] Chest Pain [ -] Palpitations [ ] CHRISTENSEN [ ] PND [ ] Orthopnea  Gastrointestinal: [x ] Abdominal Pain [ ] Diarrhea [ ] Constipation [ ] Hemorrhoids [ ] Nausea [ ] Vomiting  Genitourinary: [ ] Nocturia [- ] Dysuria [ ] Incontinence  Extremities: [- ] Swelling [ ] Joint Pain  Neurologic: [ ] Focal deficit [ ] Paresthesias [- ] Syncope  Skin: [- ] Rash [ ] Ecchymoses [ ] Wounds [ ] Lesions  Psychiatry: [- ] Depression [ ] Suicidal/Homicidal ideation [ ] Anxiety [ ] Sleep disturbances  [ x] 10 point review of systems is otherwise negative except as mentioned above            [ ]Unable to obtain    PHYSICAL EXAM:  T(C): 37 (01-20-25 @ 11:23), Max: 37 (01-20-25 @ 11:23)  HR: 71 (01-20-25 @ 11:23) (64 - 71)  BP: 183/85 (01-20-25 @ 11:23) (178/90 - 215/102)  RR: 23 (01-20-25 @ 11:23) (18 - 23)  SpO2: 100% (01-20-25 @ 11:23) (100% - 100%)  Wt(kg): --  I&O's Summary      Appearance: No acute distress  HEENT:   mmm  Cardiovascular: Normal S1 S2, no elevated JVP, no murmurs, no edema  Respiratory: Lungs clear to auscultation	, good air movement  Psychiatry: A & O x 3, Mood & affect appropriate  Gastrointestinal:  soft nt   Skin: no cyanosis	    LABS:	 	  CBC Full  -  ( 20 Jan 2025 05:58 )  WBC Count : 7.45 K/uL  Hemoglobin : 13.4 g/dL  Hematocrit : 42.3 %  Platelet Count - Automated : 180 K/uL    01-20  133[L]  |  100  |  12  ----------------------------<  168[H]  4.0   |  28  |  0.93    Ca    9.0      20 Jan 2025 05:58    TPro  8.7[H]  /  Alb  3.9  /  TBili  0.4  /  DBili  x   /  AST  25  /  ALT  12  /  AlkPhos  92  01-20      proBNP:   Lipid Profile:   HgA1c:   TSH:     CARDIAC MARKERS:  Troponin I, High Sensitivity Result: 9.3 ng/L (01-20-25 @ 05:58)    ECG:  	NSR  RADIOLOGY:  OTHER: 	    PREVIOUS DIAGNOSTIC TESTING:    [x] Echocardiogram: < from: TTE Echo Complete w/o Contrast w/ Doppler (03.14.23 @ 11:04) >  Summary:   1. Left ventricular ejection fraction, by visual estimation, is 65 to   70%.   2. Normal global left ventricular systolic function.   3. Spectral Doppler shows impaired relaxation pattern of left   ventricular myocardial filling (Grade I diastolic dysfunction).   4. Mild tricuspid regurgitation.   5. Mild aortic regurgitation.   6. Mild pulmonic valve regurgitation.    < end of copied text >    [ ] Catheterization:  [ ] Stress Test:

## 2025-01-20 NOTE — H&P ADULT - ASSESSMENT
78 yo F      h/o    HTN /  RA on  prednisone,   s/p  cholangitis/  cholecystectomy           h/o epigastric abd pain as well for 1 day.   with  n/  vomiting       denies  fever, cough, shortness of breath, diarrhea, rash, paresthes        admitted   with  abd  pain, also  had  n/  vomiting/  since  resolved   ct a/p, cholecystectomy,  mild  IH ductal  dilatation,  retaine d stone , normal  CBD        seen by surg, no  intervention        gi  eval/ dr borden    ct  head.  ,  acute  L MCA  infarct   infarct      has  no  motor  weakness.  able  to  raise  b/l  arms/  legs/  at  baseline,  needs  assistive  device      tele.  echo     neuro  eval      asa/  statin     mri  head    HTN         toprol. / lisinopril    RA.           on    daily  prednisone  1  mg  qd    son  at  bedside    dvt  ppx   total  encounter   time  60  minutes       rad< from: CT Head No Cont (01.20.25 @ 08:53) >  Impression: Acute left MCA infarct cannot be entirely excluded as   described above.  --- End of Report ---     ynes< from: CT Abdomen and Pelvis w/ Oral Cont and w/ IV Cont (01.20.25 @ 08:53) >  LIVER: Within normal limits.  BILE DUCTS: Mild intrahepatic biliary duct dilatation with pneumobilia.   The CBD is normal caliber.  GALLBLADDER: Cholecystectomy with retained stones again noted in the   remnant.  SPLEEN: Within normal limits.  PANCREAS: Within normal limits.  ADRENALS: Within normal limits.  KIDNEYS/URETERS: Left renal cortical and parapelvic cysts. The right   kidney is within normal limits. No hydronephrosis.  BLADDER: Within normal limits.  REPRODUCTIVE ORGANS: Fibroid uterus.IUD.  BOWEL: Scattered colonic diverticula. No bowel obstruction. Appendix is   normal.  PERITONEUM/RETROPERITONEUM: No ascites or pneumoperitoneum.  VESSELS: Within normal limits.  LYMPH NODES: No lymphadenopathy.  ABDOMINAL WALL: Within normal limits.  BONES: Degenerative changes especially involving both hips. Grade 1   anterolisthesis at L4-5.  IMPRESSION:  No acute findings.  --- End of Report ---           rad< from: TTE Echo Complete w/o Contrast w/ Doppler (03.14.23 @ 11:04) >  ummary:   1. Left ventricular ejection fraction, by visual estimation, is 65 to   70%.   2. Normal global left ventricular systolic function.   3. Spectral Doppler shows impaired relaxation pattern of left   ventricular myocardial filling (Grade I diastolic dysfunction).   4. Mild tricuspid regurgitation.   5. Mild aortic regurgitation.   6. Mild pulmonic valve regurgitation.    [id number] Chaparro Starr MD, [facility]  Electronically signed on 3/14/2023 at 4:39:38 P  < end of copied text >         78 yo F      h/o    HTN /  RA on  prednisone,   s/p  cholangitis/  cholecystectomy           h/o epigastric abd pain as well for 1 day.   with  n/  vomiting       denies  fever, cough, shortness of breath, diarrhea, rash, paresthes        admitted   with  abd  pain, also  had  n/  vomiting/  since  resolved   ct a/p, cholecystectomy,  mild  IH ductal  dilatation,  retaine d stone , normal  CBD        seen by surg, no  intervention         gi  eval/ dr borden/  MRCP      ct  head.  , acute  L MCA  infarct   infarct   acute   stroke .  no  acus e found . in  nsr      has  no  motor  weakness.  able  to  raise  b/l  arms/  legs/  at  baseline,  needs  assistive  device      tele.  echo      neuro  eval      asa/  statin/ plavix     mri  head,  pending    HTN         toprol. / lisinopril     bp  noted,  some  degree  of permissive  hypertension,  allowed  for  first  24  hours    RA.           on    daily  prednisone  1  mg  qd    son  at  bedside    dvt  ppx   total  encounter   time  60  minutes       rad< from: CT Head No Cont (01.20.25 @ 08:53) >  Impression: Acute left MCA infarct cannot be entirely excluded as   described above.  --- End of Report ---     ynes< from: CT Abdomen and Pelvis w/ Oral Cont and w/ IV Cont (01.20.25 @ 08:53) >  LIVER: Within normal limits.  BILE DUCTS: Mild intrahepatic biliary duct dilatation with pneumobilia.   The CBD is normal caliber.  GALLBLADDER: Cholecystectomy with retained stones again noted in the   remnant.  SPLEEN: Within normal limits.  PANCREAS: Within normal limits.  ADRENALS: Within normal limits.  KIDNEYS/URETERS: Left renal cortical and parapelvic cysts. The right   kidney is within normal limits. No hydronephrosis.  BLADDER: Within normal limits.  REPRODUCTIVE ORGANS: Fibroid uterus.IUD.  BOWEL: Scattered colonic diverticula. No bowel obstruction. Appendix is   normal.  PERITONEUM/RETROPERITONEUM: No ascites or pneumoperitoneum.  VESSELS: Within normal limits.  LYMPH NODES: No lymphadenopathy.  ABDOMINAL WALL: Within normal limits.  BONES: Degenerative changes especially involving both hips. Grade 1   anterolisthesis at L4-5.  IMPRESSION:  No acute findings.  --- End of Report ---           rad< from: TTE Echo Complete w/o Contrast w/ Doppler (03.14.23 @ 11:04) >  ummary:   1. Left ventricular ejection fraction, by visual estimation, is 65 to   70%.   2. Normal global left ventricular systolic function.   3. Spectral Doppler shows impaired relaxation pattern of left   ventricular myocardial filling (Grade I diastolic dysfunction).   4. Mild tricuspid regurgitation.   5. Mild aortic regurgitation.   6. Mild pulmonic valve regurgitation.    [id number] Chaparro Starr MD, [facility]  Electronically signed on 3/14/2023 at 4:39:38 P  < end of copied text >         78 yo F      h/o    HTN /  RA on  prednisone,   s/p  cholangitis/  cholecystectomy , yrs  ago  , ca breast        h/o epigastric abd pain as well for 1 day.   with  n/  vomiting       denies  fever, cough, shortness of breath, diarrhea, rash, paresthes        admitted   with  abd  pain, also  had  n/  vomiting/  since  resolved   ct a/p, cholecystectomy,  mild  IH ductal  dilatation,  retaine d stone , normal  CBD        seen by surg, no  intervention         gi  eval/ dr borden/  MRCP      ct  head.  , acute  L MCA  infarct   infarct   acute   stroke .  no  acus e found . in  nsr      has  no  motor  weakness.  able  to  raise  b/l  arms/  legs/  at  baseline,  needs  assistive  device      tele.  echo      neuro  eval      asa/  statin/ plavix     mri  head,  pending    HTN         toprol. / lisinopril     bp  noted,  some  degree  of permissive  hypertension,  allowed  for  first  24  hours    RA.           on    daily  prednisone  1  mg  qd    h/o  C a  breast.  s/p  L  mastecomy  2012.  on leflunomide/  non  formulary/  family  to  bring it    son  at  bedside    dvt  ppx   total  encounter   time  60  minutes       rad< from: CT Head No Cont (01.20.25 @ 08:53) >  Impression: Acute left MCA infarct cannot be entirely excluded as   described above.  --- End of Report ---     ynes< from: CT Abdomen and Pelvis w/ Oral Cont and w/ IV Cont (01.20.25 @ 08:53) >  LIVER: Within normal limits.  BILE DUCTS: Mild intrahepatic biliary duct dilatation with pneumobilia.   The CBD is normal caliber.  GALLBLADDER: Cholecystectomy with retained stones again noted in the   remnant.  SPLEEN: Within normal limits.  PANCREAS: Within normal limits.  ADRENALS: Within normal limits.  KIDNEYS/URETERS: Left renal cortical and parapelvic cysts. The right   kidney is within normal limits. No hydronephrosis.  BLADDER: Within normal limits.  REPRODUCTIVE ORGANS: Fibroid uterus.IUD.  BOWEL: Scattered colonic diverticula. No bowel obstruction. Appendix is   normal.  PERITONEUM/RETROPERITONEUM: No ascites or pneumoperitoneum.  VESSELS: Within normal limits.  LYMPH NODES: No lymphadenopathy.  ABDOMINAL WALL: Within normal limits.  BONES: Degenerative changes especially involving both hips. Grade 1   anterolisthesis at L4-5.  IMPRESSION:  No acute findings.  --- End of Report ---           rad< from: TTE Echo Complete w/o Contrast w/ Doppler (03.14.23 @ 11:04) >  ummary:   1. Left ventricular ejection fraction, by visual estimation, is 65 to   70%.   2. Normal global left ventricular systolic function.   3. Spectral Doppler shows impaired relaxation pattern of left   ventricular myocardial filling (Grade I diastolic dysfunction).   4. Mild tricuspid regurgitation.   5. Mild aortic regurgitation.   6. Mild pulmonic valve regurgitation.    [id number] Chaparro Starr MD, FACC  Electronically signed on 3/14/2023 at 4:39:38 P  < end of copied text >

## 2025-01-20 NOTE — CONSULT NOTE ADULT - SUBJECTIVE AND OBJECTIVE BOX
HPI:   80 yo F      h/o    HTN /  RA on  prednisone,   s/p  cholangitis/  cholecystectomy           h/o epigastric abd pain as well for 1 day.   with  n/  vomiting       denies  fever, cough, shortness of breath, diarrhea, rash, paresthes    seen in  er.  ha s no  abd  pain  now     son at   bedside    (20 Jan 2025 13:29)  She is s/p cholecystectomy with finding of residual stones in gallbladder remnant. LFTs are normal    REVIEW OF SYSTEMS       General: headache	    Skin/Breast:  	  Ophthalmologic:  	  ENMT:	    Respiratory and Thorax:  	  Cardiovascular:	    Gastrointestinal:	    Genitourinary:	    Musculoskeletal:	    Neurological:	    Psychiatric:	    Hematology/Lymphatics:	    Endocrine:	    Allergic/Immunologic:	    MEDICATIONS  (STANDING):  aspirin enteric coated 81 milliGRAM(s) Oral daily  atorvastatin 80 milliGRAM(s) Oral at bedtime  clopidogrel Tablet 75 milliGRAM(s) Oral daily  heparin   Injectable 5000 Unit(s) SubCutaneous every 12 hours  lisinopril 5 milliGRAM(s) Oral daily  metoprolol succinate ER 25 milliGRAM(s) Oral daily  predniSONE   Tablet 1 milliGRAM(s) Oral daily    MEDICATIONS  (PRN):      Vital Signs Last 24 Hrs  T(C): 37.2 (20 Jan 2025 16:07), Max: 37.2 (20 Jan 2025 16:07)  T(F): 99 (20 Jan 2025 16:07), Max: 99 (20 Jan 2025 16:07)  HR: 79 (20 Jan 2025 14:54) (64 - 79)  BP: 211/88 (20 Jan 2025 16:07) (178/90 - 215/102)  BP(mean): --  RR: 14 (20 Jan 2025 16:07) (14 - 23)  SpO2: 100% (20 Jan 2025 16:07) (100% - 100%)    Parameters below as of 20 Jan 2025 16:07  Patient On (Oxygen Delivery Method): room air        PHYSICAL EXAM:      Constitutional: Headache    Eyes: normal    ENMT:    Neck: supple    Breasts:    Back:    Respiratory: normal    Cardiovascular: normal    Gastrointestinal: normal    Genitourinary:    Rectal:    Extremities:    Vascular:    Neurological:    Skin:    Lymph Nodes:    Musculoskeletal:    Psychiatric:            HEALTH ISSUES - PROBLEM Dx:            Assesment & Plan: Abdominal pain with incidental finding of residual gallstone. Doubt pathogenic role of gallstones. Control BP. PPI treatment. Obsevation

## 2025-01-20 NOTE — ED PROVIDER NOTE - PROGRESS NOTE DETAILS
cts revealed acute stroke and pneumobilia so will consult neurology and general surgery and admit to medicine as per signout from Dr Leila Brennan MD I spoke to surgery who states patient can be admitted to medicine as she already had a cholecystectomy. - Mika BARRAZA I consulted Dr. Parmar of neurology who agreed to see the patient. - Mika BARRAZA

## 2025-01-20 NOTE — ED PROVIDER NOTE - CARE PROVIDER_API CALL
Bob Nur  Internal Medicine  [address] [place] [address]  Phone: [phone]  Fax: [phone]  Follow Up Time: Urgent

## 2025-01-20 NOTE — ED ADULT NURSE REASSESSMENT NOTE - NS ED NURSE REASSESS COMMENT FT1
Received report from Valerio GARCIA. No acute distress noted at this time. Patient remains on continuous cardiac monitoring. Family at bedside. Updated on plan of care.

## 2025-01-20 NOTE — H&P ADULT - HISTORY OF PRESENT ILLNESS
78 yo F      h/o    HTN /  RA on  prednisone,   s/p  cholangitis/  cholecystectomy           h/o epigastric abd pain as well for 1 day.   with  n/  vomiting       denies  fever, cough, shortness of breath, diarrhea, rash, paresthes    seen in  er.  ha s no  abd  pain  now     son at   bedside

## 2025-01-20 NOTE — ED PROVIDER NOTE - PHYSICAL EXAMINATION
Vitals: HTN at 215/102, otherwise WNL  Gen: AAOx3, NAD, sitting comfortably in stretcher  Head: ncat, perrla, eomi b/l  Neck: supple, no lymphadenopathy, no midline deviation  Heart: rrr, no m/r/g  Lungs: CTA b/l, no rales/ronchi/wheezes  Abd: soft, nontender, non-distended, no rebound or guarding  Ext: no clubbing/cyanosis/edema  Neuro: sensation and muscle strength intact b/l, no new focal weakness or sensory loss

## 2025-01-20 NOTE — ED ADULT TRIAGE NOTE - CHIEF COMPLAINT QUOTE
BIBA, pt c/o upper gastric abd pain x 1 week.  pt c/o pressure on chest area, pt states she has been vomiting (4-5 times) since 11pm.  denies diarrhea  (PMH-acid reflux, htn, hld)

## 2025-01-20 NOTE — H&P ADULT - NSHPREVIEWOFSYSTEMS_GEN_ALL_CORE
REVIEW OF SYSTEMS:  CONSTITUTIONAL: No fever,  no  weight loss  ENT:  No  tinnitus,   no   vertigo  NECK: No pain or stiffness  RESPIRATORY: No cough, wheezing, chills or hemoptysis;    No Shortness of Breath  CARDIOVASCULAR: No chest pain, palpitations, dizziness  GASTROINTESTINAL:   +  epigastric pain. / nausea, vomiting,   no  hematemesis; No diarrhea  No melena or hematochezia.  GENITOURINARY: No dysuria, frequency, hematuria, or incontinence  NEUROLOGICAL: No headaches  SKIN: No itching,  no   rash  LYMPH Nodes: No enlarged glands  ENDOCRINE: No heat or cold intolerance  MUSCULOSKELETAL: No joint pain or swelling  PSYCHIATRIC: No depression, anxiety  HEME/LYMPH: No easy bruising, or bleeding gums  ALLERGY AND IMMUNOLOGIC: No hives or eczema

## 2025-01-20 NOTE — ED ADULT NURSE NOTE - NSFALLHARMRISKINTERV_ED_ALL_ED

## 2025-01-20 NOTE — CONSULT NOTE ADULT - ASSESSMENT
79F HTN, RA who presented with headache and abd pain/N/V, found to have acute L MCA CVA on CT    check TTE  check carotid duplex/CTA head to evaluate for cerebrovascular stenosis  monitor on tele  if unclear etiology of CVA will need outpt cardiac monitoring  antiplatelet as per team  statin
79F HTN, RA who presented with headache and abd pain/N/V,  PE chronic bilateral LE weakness NIHSS 4  CTH Acute left MCA infarct cannot be entirely excluded      Impression: CTH finding possible stroke vs artifact    GI manangement  MRI brain   If MRI positive would get CTA or MRA H/N to look at the cerebral vasculature. if + would get a tte  Aspirin 81 and statin for now  DVT prophylaxis, Neurochecks  gradual normotension.   HbA1C and LDL.   PT/OT/Speech and swallow/safety eval.

## 2025-01-20 NOTE — ED PROVIDER NOTE - CARE PLAN
Principal Discharge DX:	Hypertensive emergency   reduction completed 1 Principal Discharge DX:	Acute ischemic left MCA stroke  Secondary Diagnosis:	Pneumobilia

## 2025-01-20 NOTE — H&P ADULT - NSHPLABSRESULTS_GEN_ALL_CORE
LABS:                        13.4   7.45  )-----------( 180      ( 2025 05:58 )             42.3         133[L]  |  100  |  12  ----------------------------<  168[H]  4.0   |  28  |  0.93    Ca    9.0      2025 05:58    TPro  8.7[H]  /  Alb  3.9  /  TBili  0.4  /  DBili  x   /  AST  25  /  ALT  12  /  AlkPhos  92            Urinalysis Basic - ( 2025 05:59 )    Color: Yellow / Appearance: Clear / S.013 / pH: x  Gluc: x / Ketone: Trace mg/dL  / Bili: Negative / Urobili: 0.2 mg/dL   Blood: x / Protein: 30 mg/dL / Nitrite: Negative   Leuk Esterase: Negative / RBC: 0 /HPF / WBC 1 /HPF   Sq Epi: x / Non Sq Epi: x / Bacteria: Negative /HPF      Lactate, Blood: 1.8 mmol/L ( @ 05:58)                Urinalysis with Rflx Culture (collected 25 @ 05:59)

## 2025-01-21 LAB
ANION GAP SERPL CALC-SCNC: 9 MMOL/L — SIGNIFICANT CHANGE UP (ref 5–17)
BUN SERPL-MCNC: 18 MG/DL — SIGNIFICANT CHANGE UP (ref 7–23)
CALCIUM SERPL-MCNC: 8.5 MG/DL — SIGNIFICANT CHANGE UP (ref 8.5–10.1)
CHLORIDE SERPL-SCNC: 99 MMOL/L — SIGNIFICANT CHANGE UP (ref 96–108)
CO2 SERPL-SCNC: 25 MMOL/L — SIGNIFICANT CHANGE UP (ref 22–31)
CREAT SERPL-MCNC: 1 MG/DL — SIGNIFICANT CHANGE UP (ref 0.5–1.3)
EGFR: 57 ML/MIN/1.73M2 — LOW
GLUCOSE SERPL-MCNC: 132 MG/DL — HIGH (ref 70–99)
HCT VFR BLD CALC: 44.4 % — SIGNIFICANT CHANGE UP (ref 34.5–45)
HGB BLD-MCNC: 14.4 G/DL — SIGNIFICANT CHANGE UP (ref 11.5–15.5)
MCHC RBC-ENTMCNC: 28.3 PG — SIGNIFICANT CHANGE UP (ref 27–34)
MCHC RBC-ENTMCNC: 32.4 G/DL — SIGNIFICANT CHANGE UP (ref 32–36)
MCV RBC AUTO: 87.2 FL — SIGNIFICANT CHANGE UP (ref 80–100)
NRBC # BLD: 0 /100 WBCS — SIGNIFICANT CHANGE UP (ref 0–0)
NRBC BLD-RTO: 0 /100 WBCS — SIGNIFICANT CHANGE UP (ref 0–0)
PLATELET # BLD AUTO: 232 K/UL — SIGNIFICANT CHANGE UP (ref 150–400)
POTASSIUM SERPL-MCNC: 3.9 MMOL/L — SIGNIFICANT CHANGE UP (ref 3.5–5.3)
POTASSIUM SERPL-SCNC: 3.9 MMOL/L — SIGNIFICANT CHANGE UP (ref 3.5–5.3)
RBC # BLD: 5.09 M/UL — SIGNIFICANT CHANGE UP (ref 3.8–5.2)
RBC # FLD: 13.2 % — SIGNIFICANT CHANGE UP (ref 10.3–14.5)
SODIUM SERPL-SCNC: 133 MMOL/L — LOW (ref 135–145)
WBC # BLD: 13.77 K/UL — HIGH (ref 3.8–10.5)
WBC # FLD AUTO: 13.77 K/UL — HIGH (ref 3.8–10.5)

## 2025-01-21 PROCEDURE — 99232 SBSQ HOSP IP/OBS MODERATE 35: CPT

## 2025-01-21 RX ORDER — HYDRALAZINE HCL 100 MG
10 TABLET ORAL ONCE
Refills: 0 | Status: COMPLETED | OUTPATIENT
Start: 2025-01-21 | End: 2025-01-21

## 2025-01-21 RX ORDER — ONDANSETRON 4 MG/1
4 TABLET, ORALLY DISINTEGRATING ORAL EVERY 8 HOURS
Refills: 0 | Status: DISCONTINUED | OUTPATIENT
Start: 2025-01-21 | End: 2025-01-26

## 2025-01-21 RX ORDER — SODIUM CHLORIDE 9 G/ML
1000 INJECTION, SOLUTION INTRAVENOUS
Refills: 0 | Status: DISCONTINUED | OUTPATIENT
Start: 2025-01-21 | End: 2025-01-24

## 2025-01-21 RX ADMIN — Medication 10 MILLIGRAM(S): at 05:56

## 2025-01-21 RX ADMIN — ATORVASTATIN CALCIUM 80 MILLIGRAM(S): 80 TABLET, FILM COATED ORAL at 21:23

## 2025-01-21 RX ADMIN — SODIUM CHLORIDE 50 MILLILITER(S): 9 INJECTION, SOLUTION INTRAVENOUS at 00:54

## 2025-01-21 RX ADMIN — Medication 10 MILLIGRAM(S): at 18:01

## 2025-01-21 RX ADMIN — Medication 75 MILLIGRAM(S): at 11:28

## 2025-01-21 RX ADMIN — Medication 25 MILLIGRAM(S): at 05:53

## 2025-01-21 RX ADMIN — Medication 5000 UNIT(S): at 18:00

## 2025-01-21 RX ADMIN — ASPIRIN 81 MILLIGRAM(S): 81 TABLET, COATED ORAL at 11:26

## 2025-01-21 RX ADMIN — PREDNISONE 1 MILLIGRAM(S): 5 TABLET ORAL at 05:56

## 2025-01-21 RX ADMIN — Medication 10 MILLIGRAM(S): at 02:44

## 2025-01-21 RX ADMIN — SODIUM CHLORIDE 75 MILLILITER(S): 9 INJECTION, SOLUTION INTRAVENOUS at 12:46

## 2025-01-21 RX ADMIN — Medication 5000 UNIT(S): at 05:52

## 2025-01-21 NOTE — CHART NOTE - NSCHARTNOTEFT_GEN_A_CORE
Called by RN for persistent high BP 180s-200s/90s. hydralazine 10 mg iv once. Increased lisinopril dose from 5->10 mg. ctm

## 2025-01-21 NOTE — PROGRESS NOTE ADULT - ASSESSMENT
80 yo F      h/o    HTN /  RA on  prednisone,   s/p  cholangitis/  cholecystectomy , yrs  ago  , ca breast        h/o epigastric abd pain as well for 1 day.   with  n/  vomiting       denies  fever, cough, shortness of breath, diarrhea, rash, paresthes      admitted   with  abd  pain, also  had  n/  vomiting     CT  a/p, cholecystectomy,  mild  IH ductal  dilatation,  retaine d stone , normal  CBD         seen by surg, no  intervention         gi  eval/ dr borden/  MRCP      CT head.  , acute  L MCA  infarct   infarct   acute   stroke .  no  cause found . in  nsr      has  no  motor  weakness.  able  to  raise  b/l  arms/  legs/  at  baseline,  needs  assistive  device      tele.  echo      neuro  eval ernie russell      asa/  statin     mri  head,  pending    HTN         toprol. / lisinopril     bp  noted,  some  degree  of permissive  hypertension,  allowed  for  first  24  hours/  meds  titrated  today    RA.           on    daily  prednisone  1  mg  qd    h/o  C a  breast.  s/p  L  mastecomy  2012.  on leflunomide/  non  formulary/  family  to  bring it   pt   had  some vomiting at 6  am,  with   dark   brownish vomitus . iv fluids,  gi  following    son  at  bedside    dvt  ppx   total  encounter   time    35   minutes       rad< from: CT Head No Cont (01.20.25 @ 08:53) >  Impression: Acute left MCA infarct cannot be entirely excluded as   described above.  --- End of Report ---     ynes< from: CT Abdomen and Pelvis w/ Oral Cont and w/ IV Cont (01.20.25 @ 08:53) >  LIVER: Within normal limits.  BILE DUCTS: Mild intrahepatic biliary duct dilatation with pneumobilia.   The CBD is normal caliber.  GALLBLADDER: Cholecystectomy with retained stones again noted in the   remnant.  SPLEEN: Within normal limits.  PANCREAS: Within normal limits.  ADRENALS: Within normal limits.  KIDNEYS/URETERS: Left renal cortical and parapelvic cysts. The right   kidney is within normal limits. No hydronephrosis.  BLADDER: Within normal limits.  REPRODUCTIVE ORGANS: Fibroid uterus.IUD.  BOWEL: Scattered colonic diverticula. No bowel obstruction. Appendix is   normal.  PERITONEUM/RETROPERITONEUM: No ascites or pneumoperitoneum.  VESSELS: Within normal limits.  LYMPH NODES: No lymphadenopathy.  ABDOMINAL WALL: Within normal limits.  BONES: Degenerative changes especially involving both hips. Grade 1   anterolisthesis at L4-5.  IMPRESSION:  No acute findings.  --- End of Report ---           rad< from: TTE Echo Complete w/o Contrast w/ Doppler (03.14.23 @ 11:04) >  ummary:   1. Left ventricular ejection fraction, by visual estimation, is 65 to   70%.   2. Normal global left ventricular systolic function.   3. Spectral Doppler shows impaired relaxation pattern of left   ventricular myocardial filling (Grade I diastolic dysfunction).   4. Mild tricuspid regurgitation.   5. Mild aortic regurgitation.   6. Mild pulmonic valve regurgitation.    [id number] Chaparro Starr MD, [facility]  Electronically signed on 3/14/2023 at 4:39:38 P  < end of copied text >

## 2025-01-21 NOTE — PATIENT PROFILE ADULT - NSPROHMSYMPCOND_GEN_A_NUR
"Telephonic Anticoagulation Clinic [facility])  Progress Note    Indication: PE  Goal INR: 2.0-3.0   Duration: long term  Referring Provider: Bryon Avilez MD  Order Expiration Date: 2023  Pertinent History: O2 dependent COPD. IVC filter      Assessment  Yes No   []  [] Missed doses:   []  [] Extra doses:   [x]  [] Significant medication changes (RX, OTC, Herbal): methylprednisolone(Medrol dosepak  Cefdinir-Per uptodate Cephalosporins may enhance the anticoagulant effect of Vitamin K Antagonists. Severity Moderate   []  [] High-risk maintenance medications:                  []  [] Vitamin K / dietary changes (Vitamin K goal: ___ cups/week):    []  [] Bleeding / bruising:    []  [] Falls / injury:    []  [] Acute illness:    [x]  [] Alcohol intake: per GS Anticoa galsses of odin per week   []  [] Procedures / hospitalization //ER visits::[facility] pt reports she fell on  while in the restroom. States that she slid and went down slowly in between her sink and toilet, hitting the right side of her ribs. She did not get checked out at that time and has been having pain to the right side of her ribs since. Â She reports that the ""difficulty breathing \"" is secondary to the pain in her ribs. Â She denies any other injury, she did not hit her head or lose consciousness. Â She had a routine scheduled appointment today with her PCP, where it was noted that her INR was Marleny Iveth endorses that she is taking her warfarin as she was prescribed. Upon presentation to the ED, /76, respiratory rate 22. Labs significant for elevated INR, leukocytosis, anemia and UA suggestive of urinary tract infection. CT head negative for acute process. XR right ribs unremarkable for fractures.  Received Vit K 2.5mg PO X 2    []  [] Other:    Previous warfarin maintenance dose prior to [facility] program enrollment: 4mg Mon, Fri and 5mg ROW(33mg/wk)    Active Anticoag Episode pool: [facility]  [[id number][facility]      Plan " (1&3 mg tablets)  INR Result: Pending  The INR result for today is pending based on the INR goal 2.0-3.0 .   Etiology:   Recommended Dose:4mg Mon, Fri and 5mg ROW (33mg/wk) per [address] clinic documentation-Verify   Follow-Up: pending  Comments:         [address] [facility] phone = [phone] cardiovascular/immunologic

## 2025-01-21 NOTE — PATIENT PROFILE ADULT - FALL HARM RISK - HARM RISK INTERVENTIONS

## 2025-01-21 NOTE — PROGRESS NOTE ADULT - ASSESSMENT
79F HTN, RA who presented with headache and abd pain/N/V, found to have acute L MCA CVA on CT    check TTE  check carotid duplex/CTA head to evaluate for cerebrovascular stenosis  monitor on tele  if unclear etiology of CVA will need outpt cardiac monitoring  antiplatelet as per team

## 2025-01-21 NOTE — PROGRESS NOTE ADULT - ASSESSMENT
79F HTN, RA who presented with headache and abd pain/N/V,  PE chronic bilateral LE weakness NIHSS 4  CTH Acute left MCA infarct cannot be entirely excluded      Impression: CTH finding possible stroke vs artifact    GI manangement  MRI brain   If can not get a n MRI due to metal in her arm, would repeat a CT head  If MRI positive would get CTA or MRA H/N to look at the cerebral vasculature. if + would get a tte  Aspirin 81 and statin for now  DVT prophylaxis, Neurochecks  gradual normotension.   HbA1C and LDL.   PT/OT/Speech and swallow/safety eval.

## 2025-01-21 NOTE — PATIENT PROFILE ADULT - DO YOU LACK THE NECESSARY SUPPORT TO HELP YOU COPE WITH LIFE CHALLENGES?
11/13/23                            Maggie Rodgers  [address] [facility] [address]    To Whom It May Concern:    This is to certify Maggie Rodgers was evaluated with Eunice Glover NP on 11/13/23 and is excused from work through the week and to return Monday 11/20/23. If she is feeling better towards the end of the week, may work from home.                Electronically signed by:  Eunice Glover NP  [facility] [address]  Dept Phone: [phone]        [facility]  Testing Department  Phone: [phone]  Right Fax: [phone]  [facility] 20 By:  Stuart Reich RN Date: 18    Patient Name: Coni Marin  Surgery Date: 2018    CSN: [id number]  Medical Record: [id number]   : no

## 2025-01-22 LAB
ALBUMIN SERPL ELPH-MCNC: 3 G/DL — LOW (ref 3.3–5)
ALP SERPL-CCNC: 80 U/L — SIGNIFICANT CHANGE UP (ref 40–120)
ALT FLD-CCNC: 9 U/L — LOW (ref 12–78)
ANION GAP SERPL CALC-SCNC: 7 MMOL/L — SIGNIFICANT CHANGE UP (ref 5–17)
AST SERPL-CCNC: 16 U/L — SIGNIFICANT CHANGE UP (ref 15–37)
BILIRUB DIRECT SERPL-MCNC: 0.1 MG/DL — SIGNIFICANT CHANGE UP (ref 0–0.3)
BILIRUB INDIRECT FLD-MCNC: 0.5 MG/DL — SIGNIFICANT CHANGE UP (ref 0.2–1)
BILIRUB SERPL-MCNC: 0.6 MG/DL — SIGNIFICANT CHANGE UP (ref 0.2–1.2)
BUN SERPL-MCNC: 22 MG/DL — SIGNIFICANT CHANGE UP (ref 7–23)
CALCIUM SERPL-MCNC: 8.2 MG/DL — LOW (ref 8.5–10.1)
CHLORIDE SERPL-SCNC: 101 MMOL/L — SIGNIFICANT CHANGE UP (ref 96–108)
CO2 SERPL-SCNC: 26 MMOL/L — SIGNIFICANT CHANGE UP (ref 22–31)
CREAT SERPL-MCNC: 0.75 MG/DL — SIGNIFICANT CHANGE UP (ref 0.5–1.3)
EGFR: 80 ML/MIN/1.73M2 — SIGNIFICANT CHANGE UP
GLUCOSE SERPL-MCNC: 97 MG/DL — SIGNIFICANT CHANGE UP (ref 70–99)
HCT VFR BLD CALC: 41.8 % — SIGNIFICANT CHANGE UP (ref 34.5–45)
HGB BLD-MCNC: 13.5 G/DL — SIGNIFICANT CHANGE UP (ref 11.5–15.5)
MCHC RBC-ENTMCNC: 28.6 PG — SIGNIFICANT CHANGE UP (ref 27–34)
MCHC RBC-ENTMCNC: 32.3 G/DL — SIGNIFICANT CHANGE UP (ref 32–36)
MCV RBC AUTO: 88.6 FL — SIGNIFICANT CHANGE UP (ref 80–100)
NRBC # BLD: 0 /100 WBCS — SIGNIFICANT CHANGE UP (ref 0–0)
NRBC BLD-RTO: 0 /100 WBCS — SIGNIFICANT CHANGE UP (ref 0–0)
PLATELET # BLD AUTO: 191 K/UL — SIGNIFICANT CHANGE UP (ref 150–400)
POTASSIUM SERPL-MCNC: 3.6 MMOL/L — SIGNIFICANT CHANGE UP (ref 3.5–5.3)
POTASSIUM SERPL-SCNC: 3.6 MMOL/L — SIGNIFICANT CHANGE UP (ref 3.5–5.3)
PROT SERPL-MCNC: 7.5 GM/DL — SIGNIFICANT CHANGE UP (ref 6–8.3)
RBC # BLD: 4.72 M/UL — SIGNIFICANT CHANGE UP (ref 3.8–5.2)
RBC # FLD: 13.4 % — SIGNIFICANT CHANGE UP (ref 10.3–14.5)
SODIUM SERPL-SCNC: 134 MMOL/L — LOW (ref 135–145)
WBC # BLD: 11.51 K/UL — HIGH (ref 3.8–10.5)
WBC # FLD AUTO: 11.51 K/UL — HIGH (ref 3.8–10.5)

## 2025-01-22 PROCEDURE — 99231 SBSQ HOSP IP/OBS SF/LOW 25: CPT

## 2025-01-22 PROCEDURE — 99233 SBSQ HOSP IP/OBS HIGH 50: CPT

## 2025-01-22 PROCEDURE — 70450 CT HEAD/BRAIN W/O DYE: CPT | Mod: 26

## 2025-01-22 RX ADMIN — PREDNISONE 1 MILLIGRAM(S): 5 TABLET ORAL at 05:27

## 2025-01-22 RX ADMIN — Medication 5000 UNIT(S): at 17:26

## 2025-01-22 RX ADMIN — Medication 30 MILLIGRAM(S): at 14:33

## 2025-01-22 RX ADMIN — Medication 5000 UNIT(S): at 05:59

## 2025-01-22 RX ADMIN — SODIUM CHLORIDE 75 MILLILITER(S): 9 INJECTION, SOLUTION INTRAVENOUS at 11:29

## 2025-01-22 RX ADMIN — Medication 10 MILLIGRAM(S): at 05:27

## 2025-01-22 RX ADMIN — Medication 25 MILLIGRAM(S): at 05:27

## 2025-01-22 RX ADMIN — ASPIRIN 81 MILLIGRAM(S): 81 TABLET, COATED ORAL at 14:38

## 2025-01-22 RX ADMIN — SODIUM CHLORIDE 75 MILLILITER(S): 9 INJECTION, SOLUTION INTRAVENOUS at 05:27

## 2025-01-22 RX ADMIN — ATORVASTATIN CALCIUM 80 MILLIGRAM(S): 80 TABLET, FILM COATED ORAL at 21:23

## 2025-01-22 NOTE — PROGRESS NOTE ADULT - ASSESSMENT
78yo female admitted with acute stroke with RUQ tenderness seen with CT findings of pneumobilia and retained stones in GB remnant   PMH per chart: hiatal hernia, OA, osteoporosis, Hypothyroid, CAD post MI, HTN, breast CA s/p L mastectomy 2011,  Psxh cholecystectomy and umbilical henia repair, CBD obstruction and cholangitis requiring stent and stone extraction in 2016 and 2018 with Dr Simmons Onu.  with leukocytosis, LFTs wnl. Afebrile.  - f/u MRCP  - IVFs, diet as tolerated  - analgesics prn   - continue care per primary team   - Discussed with Dr. Johnson

## 2025-01-22 NOTE — PROGRESS NOTE ADULT - ASSESSMENT
##Abdominal Pain  #Nausea and Vomiting  History of epigastric abdominal pain for 1 day accompanied by nausea and vomiting. Denies fever, cough, shortness of breath, diarrhea, rash, or paresthesias. CT of abdomen/pelvis reviewed: status post cholecystectomy, mild intrahepatic ductal dilatation, and retained stone with normal common bile duct.  Seen by surgery; no intervention required.   - Pending MCRP  - GI, Gen surg following     ##Acute CVA  CT head revealed acute left middle cerebral artery (L MCA) infarct. No identified cause, patient in normal sinus rhythm (NSR). No motor weakness noted; patient can raise both arms and legs, but is at baseline function and requires an assistive device.  - C/w ASA 81/d, Statin   - Check A1c, Lipid panel   - Pending MRI of the head  - Echo if MRI positive (per hospital policy)  - Past permissive htn -> Needs good control   - PT/OT/Speech eval   - Telemetry monitoring in place.    #HTN  Now past permissive HTN window. Will aggressively reduce  -  Uptitrate Lisinopril 40/d    #Rheumatoid Arthritis (RA)  - C/w daily prednisone 1 mg  - Taking leflunomide, which is a non-formulary medication; family to bring it to the hospital.    #Breast Cancer History  Status post left mastectomy in 2012. No active treatment for breast cancer at present.    Diet: full liquid   DVT prophylaxis: SQ heparin   Dispo: Tele   Code Status: FC      I have spent a total of 51 minutes to prepare to see the patient, obtaining and reviewing history, physical examination, explaining the diagnosis, prognosis and treatment plan with the patient/family/caregiver. I also have spent the time ordering studies and testing, interpreting results, medicine reconciliation, IDRs, subspecialty consultation and documentation as above.

## 2025-01-23 LAB
A1C WITH ESTIMATED AVERAGE GLUCOSE RESULT: 5.4 % — SIGNIFICANT CHANGE UP (ref 4–5.6)
ANION GAP SERPL CALC-SCNC: 7 MMOL/L — SIGNIFICANT CHANGE UP (ref 5–17)
BASOPHILS # BLD AUTO: 0.03 K/UL — SIGNIFICANT CHANGE UP (ref 0–0.2)
BASOPHILS NFR BLD AUTO: 0.3 % — SIGNIFICANT CHANGE UP (ref 0–2)
BUN SERPL-MCNC: 24 MG/DL — HIGH (ref 7–23)
CALCIUM SERPL-MCNC: 8.1 MG/DL — LOW (ref 8.5–10.1)
CHLORIDE SERPL-SCNC: 105 MMOL/L — SIGNIFICANT CHANGE UP (ref 96–108)
CHOLEST SERPL-MCNC: 143 MG/DL — SIGNIFICANT CHANGE UP
CO2 SERPL-SCNC: 25 MMOL/L — SIGNIFICANT CHANGE UP (ref 22–31)
CREAT SERPL-MCNC: 0.76 MG/DL — SIGNIFICANT CHANGE UP (ref 0.5–1.3)
EGFR: 79 ML/MIN/1.73M2 — SIGNIFICANT CHANGE UP
EOSINOPHIL # BLD AUTO: 0.04 K/UL — SIGNIFICANT CHANGE UP (ref 0–0.5)
EOSINOPHIL NFR BLD AUTO: 0.4 % — SIGNIFICANT CHANGE UP (ref 0–6)
ESTIMATED AVERAGE GLUCOSE: 108 MG/DL — SIGNIFICANT CHANGE UP (ref 68–114)
GLUCOSE SERPL-MCNC: 67 MG/DL — LOW (ref 70–99)
HCT VFR BLD CALC: 38.9 % — SIGNIFICANT CHANGE UP (ref 34.5–45)
HDLC SERPL-MCNC: 67 MG/DL — SIGNIFICANT CHANGE UP
HGB BLD-MCNC: 12.3 G/DL — SIGNIFICANT CHANGE UP (ref 11.5–15.5)
IMM GRANULOCYTES NFR BLD AUTO: 0.4 % — SIGNIFICANT CHANGE UP (ref 0–0.9)
LIPID PNL WITH DIRECT LDL SERPL: 61 MG/DL — SIGNIFICANT CHANGE UP
LYMPHOCYTES # BLD AUTO: 0.91 K/UL — LOW (ref 1–3.3)
LYMPHOCYTES # BLD AUTO: 9.3 % — LOW (ref 13–44)
MAGNESIUM SERPL-MCNC: 2.4 MG/DL — SIGNIFICANT CHANGE UP (ref 1.6–2.6)
MCHC RBC-ENTMCNC: 28.5 PG — SIGNIFICANT CHANGE UP (ref 27–34)
MCHC RBC-ENTMCNC: 31.6 G/DL — LOW (ref 32–36)
MCV RBC AUTO: 90.3 FL — SIGNIFICANT CHANGE UP (ref 80–100)
MONOCYTES # BLD AUTO: 1.01 K/UL — HIGH (ref 0–0.9)
MONOCYTES NFR BLD AUTO: 10.3 % — SIGNIFICANT CHANGE UP (ref 2–14)
NEUTROPHILS # BLD AUTO: 7.75 K/UL — HIGH (ref 1.8–7.4)
NEUTROPHILS NFR BLD AUTO: 79.3 % — HIGH (ref 43–77)
NON HDL CHOLESTEROL: 76 MG/DL — SIGNIFICANT CHANGE UP
NRBC # BLD: 0 /100 WBCS — SIGNIFICANT CHANGE UP (ref 0–0)
NRBC BLD-RTO: 0 /100 WBCS — SIGNIFICANT CHANGE UP (ref 0–0)
PLATELET # BLD AUTO: 146 K/UL — LOW (ref 150–400)
POTASSIUM SERPL-MCNC: 3.5 MMOL/L — SIGNIFICANT CHANGE UP (ref 3.5–5.3)
POTASSIUM SERPL-SCNC: 3.5 MMOL/L — SIGNIFICANT CHANGE UP (ref 3.5–5.3)
RBC # BLD: 4.31 M/UL — SIGNIFICANT CHANGE UP (ref 3.8–5.2)
RBC # FLD: 13.1 % — SIGNIFICANT CHANGE UP (ref 10.3–14.5)
SODIUM SERPL-SCNC: 137 MMOL/L — SIGNIFICANT CHANGE UP (ref 135–145)
TRIGL SERPL-MCNC: 77 MG/DL — SIGNIFICANT CHANGE UP
WBC # BLD: 9.78 K/UL — SIGNIFICANT CHANGE UP (ref 3.8–10.5)
WBC # FLD AUTO: 9.78 K/UL — SIGNIFICANT CHANGE UP (ref 3.8–10.5)

## 2025-01-23 PROCEDURE — 99232 SBSQ HOSP IP/OBS MODERATE 35: CPT

## 2025-01-23 RX ORDER — AMLODIPINE BESYLATE 5 MG
10 TABLET ORAL DAILY
Refills: 0 | Status: DISCONTINUED | OUTPATIENT
Start: 2025-01-23 | End: 2025-01-26

## 2025-01-23 RX ADMIN — PREDNISONE 1 MILLIGRAM(S): 5 TABLET ORAL at 05:37

## 2025-01-23 RX ADMIN — ATORVASTATIN CALCIUM 80 MILLIGRAM(S): 80 TABLET, FILM COATED ORAL at 21:51

## 2025-01-23 RX ADMIN — Medication 5000 UNIT(S): at 17:54

## 2025-01-23 RX ADMIN — Medication 40 MILLIGRAM(S): at 05:37

## 2025-01-23 RX ADMIN — SODIUM CHLORIDE 75 MILLILITER(S): 9 INJECTION, SOLUTION INTRAVENOUS at 11:13

## 2025-01-23 RX ADMIN — Medication 25 MILLIGRAM(S): at 05:37

## 2025-01-23 RX ADMIN — Medication 5000 UNIT(S): at 05:40

## 2025-01-23 RX ADMIN — ASPIRIN 81 MILLIGRAM(S): 81 TABLET, COATED ORAL at 11:12

## 2025-01-23 NOTE — PHYSICAL THERAPY INITIAL EVALUATION ADULT - ADDITIONAL COMMENTS
as per patient, pt lives with Son Jamal, pt has 5 stairs to enter with 2 HR's (wide rails), she stays on the main level. pt was independent with use of two canes.
lives with son in house on first floor. with 5 steps and bilateral rail to enter. as per son ambulates with bilateral canes as she will not use RW.

## 2025-01-23 NOTE — PHYSICAL THERAPY INITIAL EVALUATION ADULT - CRITERIA FOR SKILLED THERAPEUTIC INTERVENTIONS
Acute rehab/impairments found/functional limitations in following categories/risk reduction/prevention/rehab potential/therapy frequency/predicted duration of therapy intervention/anticipated equipment needs at discharge/anticipated discharge recommendation
impairments found/functional limitations in following categories/risk reduction/prevention/rehab potential/therapy frequency/predicted duration of therapy intervention/anticipated equipment needs at discharge/anticipated discharge recommendation

## 2025-01-23 NOTE — PHYSICAL THERAPY INITIAL EVALUATION ADULT - BED MOBILITY TRAINING, PT EVAL
Pt to preform bed mobility supine <> EOB independently in 4 weeks
Pt to preform bed mobility supine <> EOB independently in 4 weeks

## 2025-01-23 NOTE — PROGRESS NOTE ADULT - ASSESSMENT
##Abdominal Pain  #Nausea and Vomiting  History of epigastric abdominal pain for 1 day accompanied by nausea and vomiting. Denies fever, cough, shortness of breath, diarrhea, rash, or paresthesias. CT of abdomen/pelvis reviewed: status post cholecystectomy, mild intrahepatic ductal dilatation, and retained stone with normal common bile duct.  Seen by surgery; no intervention required.   - Pending MCRP  - GI, Gen surg following     ##Acute CVA  CT head concerning for acute left middle cerebral artery (L MCA) infarct that cannot be ruled out. No identified cause, patient in normal sinus rhythm (NSR). No motor weakness noted; patient can raise both arms and legs, but is at baseline function and requires an assistive device.  - C/w ASA 81/d, Statin   - Check A1c, Lipid panel   - Pending MRI of the head  - Echo if MRI positive (per hospital policy)  - Past permissive htn -> Needs good control   - PT/OT/Speech eval   - Telemetry monitoring in place.    #HTN  Now past permissive HTN window. Will aggressively reduce  -  C/w Lisinopril 40/d, add amlodipine 10/d    #Rheumatoid Arthritis (RA)  - C/w daily prednisone 1 mg  - Taking leflunomide, which is a non-formulary medication; family to bring it to the hospital.    #Breast Cancer History  Status post left mastectomy in 2012. No active treatment for breast cancer at present.    Diet: full liquid   DVT prophylaxis: SQ heparin   Dispo: Tele   Code Status: FC      I have spent a total of 51 minutes to prepare to see the patient, obtaining and reviewing history, physical examination, explaining the diagnosis, prognosis and treatment plan with the patient/family/caregiver. I also have spent the time ordering studies and testing, interpreting results, medicine reconciliation, IDRs, subspecialty consultation and documentation as above.       ##Abdominal Pain  #Nausea and Vomiting  History of epigastric abdominal pain for 1 day accompanied by nausea and vomiting. Denies fever, cough, shortness of breath, diarrhea, rash, or paresthesias. CT of abdomen/pelvis reviewed: status post cholecystectomy, mild intrahepatic ductal dilatation, and retained stone with normal common bile duct.  Seen by surgery; no intervention required.   - Pending MCRP  - GI, Gen surg following     ##Acute CVA  CT head concerning for acute left middle cerebral artery (L MCA) infarct that cannot be ruled out. No identified cause, patient in normal sinus rhythm (NSR). No motor weakness noted; patient can raise both arms and legs, but is at baseline function and requires an assistive device.  - C/w ASA 81/d, Statin   - Check A1c, Lipid panel   - Pending MRI of the head  - Past permissive htn -> Needs good control   - PT/OT/Speech eval   - Telemetry monitoring in place.    #HTN  Now past permissive HTN window. Will aggressively reduce  -  C/w Lisinopril 40/d, add amlodipine 10/d    #Rheumatoid Arthritis (RA)  - C/w daily prednisone 1 mg  - Taking leflunomide, which is a non-formulary medication; family to bring it to the hospital.    #Breast Cancer History  Status post left mastectomy in 2012. No active treatment for breast cancer at present.    Diet: full liquid   DVT prophylaxis: SQ heparin   Dispo: Tele   Code Status: FC      I have spent a total of 41 minutes to prepare to see the patient, obtaining and reviewing history, physical examination, explaining the diagnosis, prognosis and treatment plan with the patient/family/caregiver. I also have spent the time ordering studies and testing, interpreting results, medicine reconciliation, IDRs, subspecialty consultation and documentation as above.

## 2025-01-23 NOTE — PHYSICAL THERAPY INITIAL EVALUATION ADULT - GENERAL OBSERVATIONS, REHAB EVAL
Chart reviewed, pt encountered on supine, AxOx4, cooperative, pt's son Jamal present, + IV (disconnected before session)
supine in bed. NAD. Agreeable to PT eval +IV. + primafit. +cardiac monitor

## 2025-01-23 NOTE — PROGRESS NOTE ADULT - ASSESSMENT
79F HTN, RA who presented with headache and abd pain/N/V,  PE chronic bilateral LE weakness NIHSS 4  CTH Acute left MCA infarct cannot be entirely excluded      Impression: CTH finding possible stroke vs artifact    GI manangement  MRI brain   If can not get a n MRI due to metal in her arm, would repeat a CT head-f/u results  If MRI or repeat CTH positive would get CTA or MRA H/N to look at the cerebral vasculature.   if + would get a tte  Aspirin 81 and statin for now  DVT prophylaxis, Neurochecks  gradual normotension.   HbA1C and LDL.   PT/OT/Speech and swallow/safety eval.

## 2025-01-23 NOTE — OCCUPATIONAL THERAPY INITIAL EVALUATION ADULT - ADDITIONAL COMMENTS
as per patient, pt lives with Son Jamal, pt has 5 stairs to enter with 2 HR's (wide rails), she stays on the main level. pt was independent with use of two canes.

## 2025-01-23 NOTE — PHYSICAL THERAPY INITIAL EVALUATION ADULT - IMPAIRMENTS FOUND, PT EVAL
aerobic capacity/endurance/fine motor/gait, locomotion, and balance/gross motor/muscle strength/neuromotor development and sensory integration
aerobic capacity/endurance/fine motor/gait, locomotion, and balance/gross motor/muscle strength/neuromotor development and sensory integration

## 2025-01-23 NOTE — PROGRESS NOTE ADULT - ASSESSMENT
78 Y/O female admitted with acute stroke with RUQ tenderness seen with CT findings of pneumobilia and retained stones in GB remnant.    PMH per chart: hiatal hernia, OA, osteoporosis, Hypothyroid, CAD post MI, HTN, breast CA s/p L mastectomy 2011,  PSHx cholecystectomy and umbilical henia repair, CBD obstruction and cholangitis requiring stent and stone extraction in 2016 and 2018 with Dr Iris Walls.  Leukocytosis resolved. Afebrile.    PLAN:     - F/U MRCP  - IVFs, diet as tolerated  - Analgesics prn   - Continue care per primary team   - Discussed with Dr. Gipson

## 2025-01-23 NOTE — PHYSICAL THERAPY INITIAL EVALUATION ADULT - PERTINENT HX OF CURRENT PROBLEM, REHAB EVAL
This is the hx of EDWIN a 78 y/o female patient who was admitted to [facility] due to complications of Acute Ischemic L MCA Stroke affecting medical condition and with subsequent affection on functional mobility.
CVA, Left MCA and s/p cholecystectomy

## 2025-01-23 NOTE — OCCUPATIONAL THERAPY INITIAL EVALUATION ADULT - GENERAL OBSERVATIONS, REHAB EVAL
Pt encountered semi supine in bed, NAD, all lines intact, pt dx with Acute Ischemic L MCA Stroke, complain of no pain, slight headache, pt agreeable to OT eval, PT Cuco present.

## 2025-01-23 NOTE — PHYSICAL THERAPY INITIAL EVALUATION ADULT - TRANSFER TRAINING, PT EVAL
pt to preform sit <> Stand transfer RW independently in 4 weeks.
pt to preform sit <> Stand transfer RW independently in 4 weeks.

## 2025-01-23 NOTE — PHYSICAL THERAPY INITIAL EVALUATION ADULT - STRENGTHENING, PT EVAL
pt to improve BLE strength to 4/5 grossly to improve carryover with functional mobility in 4 weeks.
pt to improve BLE strength to 4/5 grossly to improve carryover with functional mobility in 4 weeks.

## 2025-01-23 NOTE — PHYSICAL THERAPY INITIAL EVALUATION ADULT - LEVEL OF CONSCIOUSNESS, REHAB EVAL
alert
I will SWITCH the dose or number of times a day I take the medications listed below when I get home from the hospital:  None
alert

## 2025-01-23 NOTE — PHYSICAL THERAPY INITIAL EVALUATION ADULT - BALANCE TRAINING, PT EVAL
pt to improve static and dynamic standing balance by full grade in 4 weeks.
pt to improve static and dynamic standing balance by full grade in 4 weeks.

## 2025-01-23 NOTE — PHYSICAL THERAPY INITIAL EVALUATION ADULT - GAIT DEVIATIONS NOTED, PT EVAL
increased time in double stance/decreased step length/decreased stride length
decreased ten/increased time in double stance/decreased step length/decreased weight-shifting ability

## 2025-01-23 NOTE — OCCUPATIONAL THERAPY INITIAL EVALUATION ADULT - PERTINENT HX OF CURRENT PROBLEM, REHAB EVAL
This is the hx of EDWIN a 80 y/o female patient who was admitted to [facility] due to complications of Acute Ischemic L MCA Stroke affecting medical condition and with subsequent affection on functional mobility.

## 2025-01-24 LAB
ANION GAP SERPL CALC-SCNC: 9 MMOL/L — SIGNIFICANT CHANGE UP (ref 5–17)
BASOPHILS # BLD AUTO: 0.03 K/UL — SIGNIFICANT CHANGE UP (ref 0–0.2)
BASOPHILS NFR BLD AUTO: 0.4 % — SIGNIFICANT CHANGE UP (ref 0–2)
BUN SERPL-MCNC: 23 MG/DL — SIGNIFICANT CHANGE UP (ref 7–23)
CALCIUM SERPL-MCNC: 8 MG/DL — LOW (ref 8.5–10.1)
CHLORIDE SERPL-SCNC: 107 MMOL/L — SIGNIFICANT CHANGE UP (ref 96–108)
CO2 SERPL-SCNC: 24 MMOL/L — SIGNIFICANT CHANGE UP (ref 22–31)
CREAT SERPL-MCNC: 0.69 MG/DL — SIGNIFICANT CHANGE UP (ref 0.5–1.3)
EGFR: 88 ML/MIN/1.73M2 — SIGNIFICANT CHANGE UP
EOSINOPHIL # BLD AUTO: 0.15 K/UL — SIGNIFICANT CHANGE UP (ref 0–0.5)
EOSINOPHIL NFR BLD AUTO: 1.8 % — SIGNIFICANT CHANGE UP (ref 0–6)
GLUCOSE SERPL-MCNC: 54 MG/DL — CRITICAL LOW (ref 70–99)
HCT VFR BLD CALC: 36.7 % — SIGNIFICANT CHANGE UP (ref 34.5–45)
HGB BLD-MCNC: 11.5 G/DL — SIGNIFICANT CHANGE UP (ref 11.5–15.5)
IMM GRANULOCYTES NFR BLD AUTO: 0.5 % — SIGNIFICANT CHANGE UP (ref 0–0.9)
LYMPHOCYTES # BLD AUTO: 0.82 K/UL — LOW (ref 1–3.3)
LYMPHOCYTES # BLD AUTO: 10 % — LOW (ref 13–44)
MAGNESIUM SERPL-MCNC: 2.2 MG/DL — SIGNIFICANT CHANGE UP (ref 1.6–2.6)
MCHC RBC-ENTMCNC: 28.2 PG — SIGNIFICANT CHANGE UP (ref 27–34)
MCHC RBC-ENTMCNC: 31.3 G/DL — LOW (ref 32–36)
MCV RBC AUTO: 90 FL — SIGNIFICANT CHANGE UP (ref 80–100)
MONOCYTES # BLD AUTO: 0.77 K/UL — SIGNIFICANT CHANGE UP (ref 0–0.9)
MONOCYTES NFR BLD AUTO: 9.4 % — SIGNIFICANT CHANGE UP (ref 2–14)
NEUTROPHILS # BLD AUTO: 6.4 K/UL — SIGNIFICANT CHANGE UP (ref 1.8–7.4)
NEUTROPHILS NFR BLD AUTO: 77.9 % — HIGH (ref 43–77)
NRBC # BLD: 0 /100 WBCS — SIGNIFICANT CHANGE UP (ref 0–0)
NRBC BLD-RTO: 0 /100 WBCS — SIGNIFICANT CHANGE UP (ref 0–0)
PLATELET # BLD AUTO: 156 K/UL — SIGNIFICANT CHANGE UP (ref 150–400)
POTASSIUM SERPL-MCNC: 3.4 MMOL/L — LOW (ref 3.5–5.3)
POTASSIUM SERPL-SCNC: 3.4 MMOL/L — LOW (ref 3.5–5.3)
RBC # BLD: 4.08 M/UL — SIGNIFICANT CHANGE UP (ref 3.8–5.2)
RBC # FLD: 12.9 % — SIGNIFICANT CHANGE UP (ref 10.3–14.5)
SODIUM SERPL-SCNC: 140 MMOL/L — SIGNIFICANT CHANGE UP (ref 135–145)
WBC # BLD: 8.21 K/UL — SIGNIFICANT CHANGE UP (ref 3.8–10.5)
WBC # FLD AUTO: 8.21 K/UL — SIGNIFICANT CHANGE UP (ref 3.8–10.5)

## 2025-01-24 PROCEDURE — 70551 MRI BRAIN STEM W/O DYE: CPT | Mod: 26

## 2025-01-24 PROCEDURE — 99232 SBSQ HOSP IP/OBS MODERATE 35: CPT

## 2025-01-24 PROCEDURE — 74181 MRI ABDOMEN W/O CONTRAST: CPT | Mod: 26

## 2025-01-24 RX ORDER — POTASSIUM CHLORIDE 750 MG/1
40 TABLET, EXTENDED RELEASE ORAL ONCE
Refills: 0 | Status: COMPLETED | OUTPATIENT
Start: 2025-01-24 | End: 2025-01-24

## 2025-01-24 RX ORDER — DM/PSEUDOEPHED/ACETAMINOPHEN 10-30-250
25 CAPSULE ORAL ONCE
Refills: 0 | Status: COMPLETED | OUTPATIENT
Start: 2025-01-24 | End: 2025-01-24

## 2025-01-24 RX ORDER — SODIUM CHLORIDE 9 G/ML
1000 INJECTION, SOLUTION INTRAVENOUS
Refills: 0 | Status: DISCONTINUED | OUTPATIENT
Start: 2025-01-24 | End: 2025-01-26

## 2025-01-24 RX ADMIN — Medication 10 MILLIGRAM(S): at 05:36

## 2025-01-24 RX ADMIN — SODIUM CHLORIDE 80 MILLILITER(S): 9 INJECTION, SOLUTION INTRAVENOUS at 08:48

## 2025-01-24 RX ADMIN — Medication 5000 UNIT(S): at 17:28

## 2025-01-24 RX ADMIN — SODIUM CHLORIDE 75 MILLILITER(S): 9 INJECTION, SOLUTION INTRAVENOUS at 07:37

## 2025-01-24 RX ADMIN — PREDNISONE 1 MILLIGRAM(S): 5 TABLET ORAL at 05:35

## 2025-01-24 RX ADMIN — Medication 40 MILLIGRAM(S): at 05:35

## 2025-01-24 RX ADMIN — SODIUM CHLORIDE 75 MILLILITER(S): 9 INJECTION, SOLUTION INTRAVENOUS at 08:08

## 2025-01-24 RX ADMIN — Medication 25 MILLIGRAM(S): at 05:36

## 2025-01-24 RX ADMIN — POTASSIUM CHLORIDE 40 MILLIEQUIVALENT(S): 750 TABLET, EXTENDED RELEASE ORAL at 09:16

## 2025-01-24 RX ADMIN — ATORVASTATIN CALCIUM 80 MILLIGRAM(S): 80 TABLET, FILM COATED ORAL at 21:22

## 2025-01-24 RX ADMIN — SODIUM CHLORIDE 80 MILLILITER(S): 9 INJECTION, SOLUTION INTRAVENOUS at 09:16

## 2025-01-24 RX ADMIN — ASPIRIN 81 MILLIGRAM(S): 81 TABLET, COATED ORAL at 12:05

## 2025-01-24 RX ADMIN — Medication 5000 UNIT(S): at 05:39

## 2025-01-24 RX ADMIN — SODIUM CHLORIDE 80 MILLILITER(S): 9 INJECTION, SOLUTION INTRAVENOUS at 23:41

## 2025-01-24 RX ADMIN — Medication 25 MILLILITER(S): at 08:48

## 2025-01-24 NOTE — PROGRESS NOTE ADULT - ASSESSMENT
##Abdominal Pain  #Nausea and Vomiting  History of epigastric abdominal pain for 1 day accompanied by nausea and vomiting. Denies fever, cough, shortness of breath, diarrhea, rash, or paresthesias. CT of abdomen/pelvis reviewed: status post cholecystectomy, mild intrahepatic ductal dilatation, and retained stone with normal common bile duct.  Seen by surgery; no intervention required.   - Pending MCRP  - GI, Gen surg following     ##Acute CVA  CT head concerning for acute left middle cerebral artery (L MCA) infarct that cannot be ruled out. No identified cause, patient in normal sinus rhythm (NSR). No motor weakness noted; patient can raise both arms and legs, but is at baseline function and requires an assistive device. A1c 5.4. LDL61  - C/w ASA 81/d, Statin   - Pending MRI of the head  - Past permissive htn -> Needs good control   - PT/OT/Speech eval -> family wants home PT  - D/c tele     #HTN  Now past permissive HTN window. Will aggressively reduce  -  C/w Lisinopril 40/d, amlodipine 10/d    #Rheumatoid Arthritis (RA)  - C/w daily prednisone 1 mg  - Taking leflunomide, which is a non-formulary medication; family to bring it to the hospital.    #Breast Cancer History  Status post left mastectomy in 2012. No active treatment for breast cancer at present.    Diet: regular   DVT prophylaxis: SQ heparin   Dispo: D/c tele, eventual home with home pt   Code Status: FC      I have spent a total of 40 minutes to prepare to see the patient, obtaining and reviewing history, physical examination, explaining the diagnosis, prognosis and treatment plan with the patient/family/caregiver. I also have spent the time ordering studies and testing, interpreting results, medicine reconciliation, IDRs, subspecialty consultation and documentation as above.

## 2025-01-24 NOTE — PROGRESS NOTE ADULT - ASSESSMENT
79F HTN, RA who presented with headache and abd pain/N/V,  PE chronic bilateral LE weakness NIHSS 4  CTH Acute left MCA infarct cannot be entirely excluded  repeat CTH  wnl    Impression: CTH finding possible stroke, repeat CTH - likely artifact    GI management  MRI Brain and C-spine can be done as an outpt  No indication fir aSA 80 from a stroke perspective  DVT prophylaxis, Neurochecks  gradual normotension.   HbA1C and LDL.   PT/OT/Speech and swallow/safety eval.

## 2025-01-25 PROCEDURE — 99232 SBSQ HOSP IP/OBS MODERATE 35: CPT

## 2025-01-25 RX ORDER — FAMOTIDINE 10 MG/ML
20 INJECTION INTRAVENOUS DAILY
Refills: 0 | Status: DISCONTINUED | OUTPATIENT
Start: 2025-01-25 | End: 2025-01-26

## 2025-01-25 RX ADMIN — SODIUM CHLORIDE 80 MILLILITER(S): 9 INJECTION, SOLUTION INTRAVENOUS at 17:14

## 2025-01-25 RX ADMIN — FAMOTIDINE 20 MILLIGRAM(S): 10 INJECTION INTRAVENOUS at 17:14

## 2025-01-25 RX ADMIN — Medication 5000 UNIT(S): at 05:08

## 2025-01-25 RX ADMIN — ATORVASTATIN CALCIUM 80 MILLIGRAM(S): 80 TABLET, FILM COATED ORAL at 21:51

## 2025-01-25 RX ADMIN — Medication 10 MILLIGRAM(S): at 05:07

## 2025-01-25 RX ADMIN — PREDNISONE 1 MILLIGRAM(S): 5 TABLET ORAL at 05:08

## 2025-01-25 RX ADMIN — Medication 25 MILLIGRAM(S): at 05:09

## 2025-01-25 RX ADMIN — Medication 40 MILLIGRAM(S): at 05:07

## 2025-01-25 RX ADMIN — Medication 5000 UNIT(S): at 17:14

## 2025-01-25 RX ADMIN — ASPIRIN 81 MILLIGRAM(S): 81 TABLET, COATED ORAL at 12:13

## 2025-01-25 NOTE — PROGRESS NOTE ADULT - SUBJECTIVE AND OBJECTIVE BOX
Hospitalist Daily Progress Note     *SUBJECTIVE*    Interval Events:  NAEON, Resting comfortably. HD Stable.      *OBJECTIVE*    PHYSICAL EXAM:  Appearance: NAD	  Cardiovascular: RRR, Normal S1 S2, No JVD  Respiratory: Lungs clear to auscultation	  Gastrointestinal:  Soft, Non-tender, + BS	  Skin: No rash, No ecchymoses	    OBJECTIVE DATA:   Vital Signs Last 24 Hrs  T(C): 36.9 (2025 10:54), Max: 36.9 (2025 04:40)  T(F): 98.4 (2025 10:54), Max: 98.5 (2025 04:40)  HR: 63 (2025 10:54) (61 - 67)  BP: 108/70 (2025 10:54) (108/70 - 153/75)  BP(mean): --  RR: 18 (2025 10:54) (17 - 18)  SpO2: 100% (2025 10:54) (97% - 100%)    Parameters below as of 2025 10:54  Patient On (Oxygen Delivery Method): room air               Daily     Daily Weight in k.1 (2025 09:11)    Labs, Interval Radiology studies, Medications reviewed by me        
Hospitalist Daily Progress Note     *SUBJECTIVE*    Interval Events:  NAEON, Resting comfortably. HD Stable.      *OBJECTIVE*    PHYSICAL EXAM:  Appearance: NAD	  Cardiovascular: RRR, Normal S1 S2, No JVD  Respiratory: Lungs clear to auscultation	  Gastrointestinal:  Soft, Non-tender, + BS	  Skin: No rash, No ecchymoses	    OBJECTIVE DATA:   Vital Signs Last 24 Hrs  T(C): 36.9 (23 Jan 2025 04:36), Max: 37 (22 Jan 2025 19:21)  T(F): 98.5 (23 Jan 2025 04:36), Max: 98.6 (22 Jan 2025 19:21)  HR: 61 (23 Jan 2025 07:00) (61 - 75)  BP: 154/75 (23 Jan 2025 04:36) (154/75 - 175/78)  BP(mean): --  RR: 18 (23 Jan 2025 04:36) (17 - 19)  SpO2: 98% (23 Jan 2025 04:36) (98% - 100%)    Parameters below as of 23 Jan 2025 04:36  Patient On (Oxygen Delivery Method): room air               Daily     Daily     Labs, Interval Radiology studies, Medications reviewed by me        
Patient seen and examined at bedside resting comfortably, pt had MRCP today.   No complaints offered.   Abdominal pain is well controlled.  Denies nausea and vomiting. Tolerating diet.  Denies chest pain, dyspnea, cough.    T(F): 97.9 (01-24-25 @ 17:13), Max: 98.3 (01-24-25 @ 12:43)  HR: 63 (01-24-25 @ 17:13) (60 - 63)  BP: 153/75 (01-24-25 @ 17:13) (133/69 - 162/70)  RR: 17 (01-24-25 @ 17:13) (17 - 18)  SpO2: 99% (01-24-25 @ 17:13) (99% - 100%)  Wt(kg): --  CAPILLARY BLOOD GLUCOSE    GENERAL: Alert, NAD  CHEST/LUNG: Respirations non labored, good inspiratory effort  HEART: S1S2  HEENT: NCAT, EOMI, conjunctiva clear   ABDOMEN: Nondistended, + bowel sounds, minimal generalized abdominal TTP   EXTREMITIES:  No calf tenderness, no edema    LABS:                        11.5   8.21  )-----------( 156      ( 24 Jan 2025 05:30 )             36.7     01-24    140  |  107  |  23  ----------------------------<  54[LL]  3.4[L]   |  24  |  0.69    Ca    8.0[L]      24 Jan 2025 05:30  Mg     2.2     01-24        I&O's Detail    23 Jan 2025 07:01  -  24 Jan 2025 07:00  --------------------------------------------------------  IN:    Lactated Ringers: 750 mL  Total IN: 750 mL    OUT:    Voided (mL): 550 mL  Total OUT: 550 mL    Total NET: 200 mL      24 Jan 2025 07:01  -  24 Jan 2025 20:05  --------------------------------------------------------  IN:    Oral Fluid: 120 mL  Total IN: 120 mL    OUT:    Voided (mL): 600 mL  Total OUT: 600 mL    Total NET: -480 mL      Culture Results:   No growth at 4 days (01-20 @ 05:59)  Culture Results:   No growth at 4 days (01-20 @ 05:59)    < from: MR MRCP No Cont (01.24.25 @ 11:58) >  FINDINGS: Severely limited examination due to motion.  LOWER CHEST: Within normal limits.    LIVER: Within normal limits.  BILE DUCTS: No biliary ductal dilatation. The common duct measures 7 mm.   Pneumobilia present. A few areas of dark signal in the distal common duct   (for example 6; 30) which could represent air or stones. GALLBLADDER:   Cholelithiasis.  SPLEEN: Within normal limits.  PANCREAS: Within normal limits.  ADRENALS: Within normal limits.  KIDNEYS/URETERS: Left renal parapelvic cysts.    VISUALIZED PORTIONS:  BOWEL: Within normal limits.  PERITONEUM: No ascites.  VESSELS: Within normal limits.  RETROPERITONEUM/LYMPH NODES: No lymphadenopathy.  ABDOMINAL WALL: Within normal limits.  BONES: Within normal limits.    IMPRESSION:  Severely limited motion degraded MRI    Cholelithiasis.    A few areas of low T2 signal within the common duct could represent   pneumobilia, however choledocholithiasis is not excluded.    No biliary ductal dilatation.          80 Y/O female admitted with acute stroke with RUQ tenderness seen with CT findings of pneumobilia and retained stones in GB remnant.    PMH per chart: hiatal hernia, OA, osteoporosis, Hypothyroid, CAD post MI, HTN, breast CA s/p L mastectomy 2011,  PSHx cholecystectomy and umbilical henia repair, CBD obstruction and cholangitis requiring stent and stone extraction in 2016 and 2018 with Dr Iris Walls.  Leukocytosis resolved. Afebrile.    PLAN:   - IVFs, diet as tolerated  - Analgesics prn   - Continue care per primary team and GI  - Discussed with Dr. Gipson
Hospitalist Daily Progress Note     *SUBJECTIVE*    Interval Events:  NAEON, Resting comfortably. HD Stable.      *OBJECTIVE*    PHYSICAL EXAM:  Appearance: NAD	  Cardiovascular: RRR, Normal S1 S2, No JVD  Respiratory: Lungs clear to auscultation	  Gastrointestinal:  Soft, Non-tender, + BS	  Skin: No rash, No ecchymoses	    OBJECTIVE DATA:   Vital Signs Last 24 Hrs  T(C): 36.8 (22 Jan 2025 10:40), Max: 37.4 (21 Jan 2025 17:46)  T(F): 98.2 (22 Jan 2025 10:40), Max: 99.3 (21 Jan 2025 17:46)  HR: 62 (22 Jan 2025 10:40) (62 - 70)  BP: 175/78 (22 Jan 2025 10:40) (152/70 - 178/81)  BP(mean): --  RR: 19 (22 Jan 2025 10:40) (14 - 19)  SpO2: 100% (22 Jan 2025 10:40) (98% - 100%)    Parameters below as of 22 Jan 2025 10:40  Patient On (Oxygen Delivery Method): room air               Daily Height in cm: 157.5 (22 Jan 2025 06:15)    Daily     Labs, Interval Radiology studies, Medications reviewed by me        
Neurology consult    LIL        On Neurological Examination:    Neuro: AAOx3; knows age, month, obeys commands, no dysarthria; calculations intact, fluent names, repeats     CN: PERRL, EOMI, VFF normal gaze preference, no facial palsy,     Motor: bilaterally LE some effort    Sensory: Intact to LT and PP no extinction    Coordination: FTN intact b/l                RADIOLOGY  < from: CT Head No Cont (01.20.25 @ 08:53) >  Impression: Acute left MCA infarct cannot be entirely excluded as   described above.    < end of copied text >              
Neurology consult    MEDICATIONS:    aspirin enteric coated 81 milliGRAM(s) Oral daily  atorvastatin 80 milliGRAM(s) Oral at bedtime  heparin   Injectable 5000 Unit(s) SubCutaneous every 12 hours  lactated ringers. 1000 milliLiter(s) IV Continuous <Continuous>  lisinopril 40 milliGRAM(s) Oral daily  metoprolol succinate ER 25 milliGRAM(s) Oral daily  ondansetron Injectable 4 milliGRAM(s) IV Push every 8 hours PRN  predniSONE   Tablet 1 milliGRAM(s) Oral daily      LABS:                          12.3   9.78  )-----------( 146      ( 23 Jan 2025 06:42 )             38.9     01-22    134[L]  |  101  |  22  ----------------------------<  97  3.6   |  26  |  0.75    Ca    8.2[L]      22 Jan 2025 05:35    TPro  7.5  /  Alb  3.0[L]  /  TBili  0.6  /  DBili  0.1  /  AST  16  /  ALT  9[L]  /  AlkPhos  80  01-22    CAPILLARY BLOOD GLUCOSE          Urinalysis Basic - ( 22 Jan 2025 05:35 )    Color: x / Appearance: x / SG: x / pH: x  Gluc: 97 mg/dL / Ketone: x  / Bili: x / Urobili: x   Blood: x / Protein: x / Nitrite: x   Leuk Esterase: x / RBC: x / WBC x   Sq Epi: x / Non Sq Epi: x / Bacteria: x      I&O's Summary    22 Jan 2025 07:01  -  23 Jan 2025 07:00  --------------------------------------------------------  IN: 0 mL / OUT: 850 mL / NET: -850 mL      Vital Signs Last 24 Hrs  T(C): 36.9 (23 Jan 2025 04:36), Max: 37 (22 Jan 2025 19:21)  T(F): 98.5 (23 Jan 2025 04:36), Max: 98.6 (22 Jan 2025 19:21)  HR: 68 (23 Jan 2025 04:36) (62 - 75)  BP: 154/75 (23 Jan 2025 04:36) (154/75 - 175/78)  BP(mean): --  RR: 18 (23 Jan 2025 04:36) (17 - 19)  SpO2: 98% (23 Jan 2025 04:36) (98% - 100%)    Parameters below as of 23 Jan 2025 04:36  Patient On (Oxygen Delivery Method): room air          On Neurological Examination:    Neuro: AAOx3; knows age, month, obeys commands, no dysarthria; calculations intact, fluent names, repeats     CN: PERRL, EOMI, VFF normal gaze preference, no facial palsy,     Motor: bilaterally LE some effort    Sensory: Intact to LT and PP no extinction    Coordination: FTN intact b/l                RADIOLOGY  < from: CT Head No Cont (01.20.25 @ 08:53) >  Impression: Acute left MCA infarct cannot be entirely excluded as   described above.    < end of copied text >              
Patient seen and  examined at bedside resting with son at bedside.   States her abdominal pain has improved since last night but admits to RUQ tenderness.   Vomited overnight, able to tolerate sips of water per son. No BM as of 2 days ago.   Denies fever, chills, N/V/D, CP, SOB.      Vital Signs Last 24 Hrs  T(F): 98.3 (01-21-25 @ 11:03), Max: 99 (01-20-25 @ 16:07)  HR: 70 (01-21-25 @ 11:03)  BP: 176/81 (01-21-25 @ 11:03)  RR: 22 (01-21-25 @ 11:03)  SpO2: 100% (01-21-25 @ 11:03)    PHYSICAL EXAM:  GENERAL: Alert, NAD  CHEST/LUNG: Clear to auscultation bilaterally, respirations nonlabored  HEART: Regular rate and rhythm; S1 & S2 appreciated  ABDOMEN: soft, ttp at RUQ and epigastric region, non distended.  EXTREMITIES:  no calf tenderness, No edema    I&O's Detail      LABS:                        14.4   13.77 )-----------( 232      ( 21 Jan 2025 06:20 )             44.4     01-21    133[L]  |  99  |  18  ----------------------------<  132[H]  3.9   |  25  |  1.00    Ca    8.5      21 Jan 2025 06:20    TPro  8.7[H]  /  Alb  3.9  /  TBili  0.4  /  DBili  x   /  AST  25  /  ALT  12  /  AlkPhos  92  01-20        RADIOLOGY & ADDITIONAL STUDIES:    A/P  80yo female admitted with acute stroke seen with CT findings of pneumobilia and retained stones in GB remnant   PMH per chart: hiatal hernia, OA, osteoporosis, Hypothyroid, CAD post MI, HTN, breast CA s/p L mastectomy 2011,  Psxh cholecystectomy and umbilical henia repair, CBD obstruction and cholangitis requiring stent and stone extraction in 2016 and 2018 with Dr Iris Walls.  with leukocytosis, LFTs wnl. Afebrile.  - f/u MRCP  - IVFs, diet as tolerated  - analgesics prn   - no surgical intervention at this time  - continue care per primary team   - will d/w surgical attending
Patient seen and examined at bedside complaining of abdominal pain that is improved with medication.   Tolerating full liquid diet.  Admits to flatus/BM.   Denies pain, nausea/ vomiting, chills, SOB, chest pain, calf tenderness.          Vital Signs Last 24 Hrs  T(F): 98.5 (01-23-25 @ 04:36), Max: 98.6 (01-22-25 @ 19:21)  HR: 61 (01-23-25 @ 07:00)  BP: 154/75 (01-23-25 @ 04:36)  RR: 18 (01-23-25 @ 04:36)  SpO2: 98% (01-23-25 @ 04:36)  Wt(kg): --   CAPILLARY BLOOD GLUCOSE          GENERAL: Alert, NAD  CHEST/LUNG: Respirations non labored, good inspiratory effort  HEART: S1S2  HEENT: NCAT, EOMI, conjunctiva clear   ABDOMEN: Nondistended, + bowel sounds, minimal generalized abdominal TTP   EXTREMITIES:  No calf tenderness, no edema    I&O's Detail    22 Jan 2025 07:01  -  23 Jan 2025 07:00  --------------------------------------------------------  IN:  Total IN: 0 mL    OUT:    Voided (mL): 850 mL  Total OUT: 850 mL    Total NET: -850 mL          LABS:                        12.3   9.78  )-----------( 146      ( 23 Jan 2025 06:42 )             38.9     01-23    137  |  105  |  24[H]  ----------------------------<  67[L]  3.5   |  25  |  0.76    Ca    8.1[L]      23 Jan 2025 06:42  Mg     2.4     01-23    TPro  7.5  /  Alb  3.0[L]  /  TBili  0.6  /  DBili  0.1  /  AST  16  /  ALT  9[L]  /  AlkPhos  80  01-22                
Patient seen and examined at bedside with Dr. Johnson, no acute issues overnight, discussed MRCP findings with pt and her son who was also at bedside, Pt tolerating diet however son states she is a picky eater so only teating minimal food. Abd pain is better. Denies N/V, had some diarrhea/soft stool overnight.  Denies chest pain, dyspnea, cough.    T(F): 98.4 (01-25-25 @ 10:54), Max: 98.5 (01-25-25 @ 04:40)  HR: 63 (01-25-25 @ 10:54) (61 - 67)  BP: 108/70 (01-25-25 @ 10:54) (108/70 - 153/75)  RR: 18 (01-25-25 @ 10:54) (17 - 18)  SpO2: 100% (01-25-25 @ 10:54) (97% - 100%)  Wt(kg): --  CAPILLARY BLOOD GLUCOSE          GENERAL: Alert, NAD  CHEST/LUNG: Respirations non labored, good inspiratory effort  HEART: S1S2  HEENT: NCAT, EOMI, conjunctiva clear   ABDOMEN: Nondistended, + bowel sounds, NT  EXTREMITIES:  No calf tenderness, no edema    LABS:                        11.5   8.21  )-----------( 156      ( 24 Jan 2025 05:30 )             36.7     01-24    140  |  107  |  23  ----------------------------<  54[LL]  3.4[L]   |  24  |  0.69    Ca    8.0[L]      24 Jan 2025 05:30  Mg     2.2     01-24        I&O's Detail    24 Jan 2025 07:01  -  25 Jan 2025 07:00  --------------------------------------------------------  IN:    dextrose 5% + sodium chloride 0.45% w/ Additives: 960 mL    Oral Fluid: 120 mL  Total IN: 1080 mL    OUT:    Voided (mL): 901 mL  Total OUT: 901 mL    Total NET: 179 mL        Culture Results:   No growth at 4 days (01-20 @ 05:59)  Culture Results:   No growth at 4 days (01-20 @ 05:59)      78 Y/O female admitted with acute stroke with RUQ tenderness seen with CT findings of pneumobilia and retained stones in GB remnant.    PMH per chart: hiatal hernia, OA, osteoporosis, Hypothyroid, CAD post MI, HTN, breast CA s/p L mastectomy 2011,  PSHx cholecystectomy and umbilical henia repair, CBD obstruction and cholangitis requiring stent and stone extraction in 2016 and 2018 with Dr Simmons Onu.  Leukocytosis resolved. Afebrile.    PLAN:   - diet as tolerated  - Analgesics prn   - Continue care per primary team  - GI to discuss possible ERCP  - no further surgical intervention at this time, reconsult prn  - discussed with Dr. Johnson
Patient seen and examined this am. No new events      MEDICATIONS:    amLODIPine   Tablet 10 milliGRAM(s) Oral daily  aspirin enteric coated 81 milliGRAM(s) Oral daily  atorvastatin 80 milliGRAM(s) Oral at bedtime  dextrose 5% + sodium chloride 0.45% 1000 milliLiter(s) IV Continuous <Continuous>  heparin   Injectable 5000 Unit(s) SubCutaneous every 12 hours  lisinopril 40 milliGRAM(s) Oral daily  metoprolol succinate ER 25 milliGRAM(s) Oral daily  ondansetron Injectable 4 milliGRAM(s) IV Push every 8 hours PRN  predniSONE   Tablet 1 milliGRAM(s) Oral daily      LABS:                          11.5   8.21  )-----------( 156      ( 24 Jan 2025 05:30 )             36.7     01-24    140  |  107  |  23  ----------------------------<  54[LL]  3.4[L]   |  24  |  0.69    Ca    8.0[L]      24 Jan 2025 05:30  Mg     2.2     01-24      CAPILLARY BLOOD GLUCOSE          Urinalysis Basic - ( 24 Jan 2025 05:30 )    Color: x / Appearance: x / SG: x / pH: x  Gluc: 54 mg/dL / Ketone: x  / Bili: x / Urobili: x   Blood: x / Protein: x / Nitrite: x   Leuk Esterase: x / RBC: x / WBC x   Sq Epi: x / Non Sq Epi: x / Bacteria: x      I&O's Summary    23 Jan 2025 07:01  -  24 Jan 2025 07:00  --------------------------------------------------------  IN: 750 mL / OUT: 550 mL / NET: 200 mL      Vital Signs Last 24 Hrs  T(C): 36.6 (24 Jan 2025 04:34), Max: 37 (23 Jan 2025 10:44)  T(F): 97.9 (24 Jan 2025 04:34), Max: 98.6 (23 Jan 2025 10:44)  HR: 63 (24 Jan 2025 04:34) (60 - 66)  BP: 162/70 (24 Jan 2025 04:34) (154/72 - 162/76)  BP(mean): --  RR: 18 (24 Jan 2025 04:34) (18 - 19)  SpO2: 100% (24 Jan 2025 04:34) (99% - 100%)    Parameters below as of 24 Jan 2025 04:34  Patient On (Oxygen Delivery Method): room air          On Neurological Examination:    Neuro: AAOx3; knows age, month, obeys commands, no dysarthria; calculations intact, fluent names, repeats     CN: PERRL, EOMI, VFF normal gaze preference, no facial palsy,     Motor: bilaterally LE some effort    Sensory: Intact to LT and PP no extinction    Coordination: FTN intact b/l                RADIOLOGY  < from: CT Head No Cont (01.20.25 @ 08:53) >  Impression: Acute left MCA infarct cannot be entirely excluded as   described above.    < end of copied text >              
  date of service: 01-21-25 @ 12:30  Merged with [facility]  REVIEW OF SYSTEMS:  CONSTITUTIONAL: No fever,  no  weight loss  ENT:  No  tinnitus,   no   vertigo  NECK: No pain or stiffness  RESPIRATORY: No cough, wheezing, chills or hemoptysis;    No Shortness of Breath  CARDIOVASCULAR: No chest pain, palpitations, dizziness  GASTROINTESTINAL: No abdominal or epigastric pain. No nausea, vomiting, or hematemesis; No diarrhea  No melena or hematochezia.  GENITOURINARY: No dysuria, frequency, hematuria, or incontinence  NEUROLOGICAL: No headaches  SKIN: No itching,  no   rash  LYMPH Nodes: No enlarged glands  ENDOCRINE: No heat or cold intolerance  MUSCULOSKELETAL: No joint pain or swelling  PSYCHIATRIC: No depression, anxiety  HEME/LYMPH: No easy bruising, or bleeding gums  ALLERGY AND IMMUNOLOGIC: No hives or eczema	    MEDICATIONS  (STANDING):  aspirin enteric coated 81 milliGRAM(s) Oral daily  atorvastatin 80 milliGRAM(s) Oral at bedtime  clopidogrel Tablet 75 milliGRAM(s) Oral daily  dextrose 5% + lactated ringers. 1000 milliLiter(s) (50 mL/Hr) IV Continuous <Continuous>  heparin   Injectable 5000 Unit(s) SubCutaneous every 12 hours  lisinopril 10 milliGRAM(s) Oral daily  metoprolol succinate ER 25 milliGRAM(s) Oral daily  predniSONE   Tablet 1 milliGRAM(s) Oral daily    MEDICATIONS  (PRN):      Vital Signs Last 24 Hrs  T(C): 36.8 (21 Jan 2025 11:03), Max: 37.2 (20 Jan 2025 16:07)  T(F): 98.3 (21 Jan 2025 11:03), Max: 99 (20 Jan 2025 16:07)  HR: 72 (21 Jan 2025 12:05) (67 - 90)  BP: 152/74 (21 Jan 2025 12:05) (124/71 - 211/88)  BP(mean): --  RR: 22 (21 Jan 2025 11:03) (14 - 25)  SpO2: 100% (21 Jan 2025 12:05) (100% - 100%)    Parameters below as of 21 Jan 2025 12:05  Patient On (Oxygen Delivery Method): room air      CAPILLARY BLOOD GLUCOSE        I&O's Summary        Appearance: Normal	  HEENT:   Normal oral mucosa, PERRL, EOMI	  Lymphatic: No lymphadenopathy  Cardiovascular: Normal S1 S2, No JVD  Respiratory: Lungs clear to auscultation	  Gastrointestinal:  Soft, Non-tender, + BS	  Skin: No rash, No ecchymoses	  Extremities:     LABS:                        14.4   13.77 )-----------( 232      ( 21 Jan 2025 06:20 )             44.4     01-21    133[L]  |  99  |  18  ----------------------------<  132[H]  3.9   |  25  |  1.00    Ca    8.5      21 Jan 2025 06:20    TPro  8.7[H]  /  Alb  3.9  /  TBili  0.4  /  DBili  x   /  AST  25  /  ALT  12  /  AlkPhos  92  01-20          Urinalysis Basic - ( 21 Jan 2025 06:20 )    Color: x / Appearance: x / SG: x / pH: x  Gluc: 132 mg/dL / Ketone: x  / Bili: x / Urobili: x   Blood: x / Protein: x / Nitrite: x   Leuk Esterase: x / RBC: x / WBC x   Sq Epi: x / Non Sq Epi: x / Bacteria: x      Lactate, Blood: 1.8 mmol/L (01-20 @ 05:58)                  Consultant(s) Notes Reviewed:      Care Discussed with Consultants/Other Providers:    
Cardiology Progress Note    Interval Events:  vomiting this AM  awaiting further imaging      MEDICATIONS:  aspirin enteric coated 81 milliGRAM(s) Oral daily  heparin   Injectable 5000 Unit(s) SubCutaneous every 12 hours  lisinopril 10 milliGRAM(s) Oral daily  metoprolol succinate ER 25 milliGRAM(s) Oral daily  ondansetron Injectable 4 milliGRAM(s) IV Push every 8 hours PRN  atorvastatin 80 milliGRAM(s) Oral at bedtime  predniSONE   Tablet 1 milliGRAM(s) Oral daily    lactated ringers. 1000 milliLiter(s) IV Continuous <Continuous>      PHYSICAL EXAM:  T(C): 36.8 (01-21-25 @ 11:03), Max: 37.2 (01-20-25 @ 16:07)  HR: 72 (01-21-25 @ 12:05) (67 - 90)  BP: 152/74 (01-21-25 @ 12:05) (124/71 - 211/88)  RR: 22 (01-21-25 @ 11:03) (14 - 25)  SpO2: 100% (01-21-25 @ 12:05) (100% - 100%)  Wt(kg): --  I&O's Summary    Appearance: No acute distress  HEENT:   mmm  Cardiovascular: Normal S1 S2, no elevated JVP, no murmurs, no edema  Respiratory: Lungs clear to auscultation	, good air movement  Psychiatry: A & O x 3, Mood & affect appropriate  Gastrointestinal:  soft nt   Skin: no cyanosis    LABS:	 	  CBC Full  -  ( 21 Jan 2025 06:20 )  WBC Count : 13.77 K/uL  Hemoglobin : 14.4 g/dL  Hematocrit : 44.4 %  Platelet Count - Automated : 232 K/uL    01-21  133[L]  |  99  |  18  ----------------------------<  132[H]  3.9   |  25  |  1.00    01-20  133[L]  |  100  |  12  ----------------------------<  168[H]  4.0   |  28  |  0.93    Ca    8.5      21 Jan 2025 06:20  Ca    9.0      20 Jan 2025 05:58    TPro  8.7[H]  /  Alb  3.9  /  TBili  0.4  /  DBili  x   /  AST  25  /  ALT  12  /  AlkPhos  92  01-20    CARDIAC MARKERS:  Troponin I, High Sensitivity Result: 9.3 ng/L (01-20-25 @ 05:58)    TELEMETRY: 	SR    ECG:  	  RADIOLOGY:  OTHER: 	    PREVIOUS DIAGNOSTIC TESTING:    [ ] Echocardiogram:   [ ] Catheterization:  [ ] Stress Test:  	
HPI:   80 yo F      h/o    HTN /  RA on  prednisone,   s/p  cholangitis/  cholecystectomy           h/o epigastric abd pain as well for 1 day.   with  n/  vomiting       denies  fever, cough, shortness of breath, diarrhea, rash, paresthes    seen in  er.  ha s no  abd  pain  now     son at   bedside    (20 Jan 2025 13:29)  Pt resting in bed. Denies abdominal pain. Tolerating oral feeding    REVIEW OF SYSTEMS unremarkable      General:	    Skin/Breast:  	  Ophthalmologic:  	  ENMT:	    Respiratory and Thorax:  	  Cardiovascular:	    Gastrointestinal:	    Genitourinary:	    Musculoskeletal:	    Neurological:	    Psychiatric:	    Hematology/Lymphatics:	    Endocrine:	    Allergic/Immunologic:	    MEDICATIONS  (STANDING):  amLODIPine   Tablet 10 milliGRAM(s) Oral daily  atorvastatin 80 milliGRAM(s) Oral at bedtime  dextrose 5% + sodium chloride 0.45% 1000 milliLiter(s) (80 mL/Hr) IV Continuous <Continuous>  famotidine    Tablet 20 milliGRAM(s) Oral daily  heparin   Injectable 5000 Unit(s) SubCutaneous every 12 hours  lisinopril 10 milliGRAM(s) Oral daily  metoprolol succinate ER 25 milliGRAM(s) Oral daily  predniSONE   Tablet 1 milliGRAM(s) Oral daily    MEDICATIONS  (PRN):  ondansetron Injectable 4 milliGRAM(s) IV Push every 8 hours PRN Nausea and/or Vomiting      Vital Signs Last 24 Hrs  T(C): 36.4 (25 Jan 2025 17:06), Max: 36.9 (25 Jan 2025 04:40)  T(F): 97.6 (25 Jan 2025 17:06), Max: 98.5 (25 Jan 2025 04:40)  HR: 69 (25 Jan 2025 17:06) (62 - 69)  BP: 120/69 (25 Jan 2025 17:06) (108/70 - 136/72)  BP(mean): --  RR: 18 (25 Jan 2025 17:06) (17 - 18)  SpO2: 100% (25 Jan 2025 17:06) (97% - 100%)    Parameters below as of 25 Jan 2025 17:06  Patient On (Oxygen Delivery Method): room air        PHYSICAL EXAM:      Constitutional: in no distress    Eyes: normal    ENMT:    Neck: normal    Breasts:    Back:    Respiratory: normal    Cardiovascular: normal    Gastrointestinal: normal    Genitourinary:    Rectal:    Extremities:    Vascular:    Neurological:    Skin:    Lymph Nodes:    Musculoskeletal:    Psychiatric:            HEALTH ISSUES - PROBLEM Dx:            Assesment & Plan: Abdominal pain improving. Continue present management        
Hospitalist Daily Progress Note     *SUBJECTIVE*    Interval Events:  NAEON, Resting comfortably. HD Stable.      *OBJECTIVE*    PHYSICAL EXAM:  Appearance: NAD	  Cardiovascular: RRR, Normal S1 S2, No JVD  Respiratory: Lungs clear to auscultation	  Gastrointestinal:  Soft, Non-tender, + BS	  Skin: No rash, No ecchymoses	    OBJECTIVE DATA:   Vital Signs Last 24 Hrs  T(C): 36.8 (24 Jan 2025 12:43), Max: 37 (23 Jan 2025 16:41)  T(F): 98.3 (24 Jan 2025 12:43), Max: 98.6 (23 Jan 2025 16:41)  HR: 61 (24 Jan 2025 12:43) (60 - 66)  BP: 133/69 (24 Jan 2025 12:43) (133/69 - 162/76)  BP(mean): --  RR: 18 (24 Jan 2025 12:43) (18 - 19)  SpO2: 99% (24 Jan 2025 12:43) (99% - 100%)    Parameters below as of 24 Jan 2025 12:43  Patient On (Oxygen Delivery Method): room air               Daily     Daily     Labs, Interval Radiology studies, Medications reviewed by me        
Patient seen and examined at bedside, patient without complaints.   Admits to minimal RUQ abdominal pain with deep palpation  Denies nausea and vomiting. Tolerating liquid diet.  Denies chest pain, dyspnea, cough.      MEDICATIONS  (STANDING):  aspirin enteric coated 81 milliGRAM(s) Oral daily  atorvastatin 80 milliGRAM(s) Oral at bedtime  heparin   Injectable 5000 Unit(s) SubCutaneous every 12 hours  lactated ringers. 1000 milliLiter(s) (75 mL/Hr) IV Continuous <Continuous>  lisinopril 10 milliGRAM(s) Oral daily  metoprolol succinate ER 25 milliGRAM(s) Oral daily  predniSONE   Tablet 1 milliGRAM(s) Oral daily    MEDICATIONS  (PRN):  ondansetron Injectable 4 milliGRAM(s) IV Push every 8 hours PRN Nausea and/or Vomiting      Vital Signs Last 24 Hrs  T(C): 37.1 (22 Jan 2025 06:15), Max: 37.4 (21 Jan 2025 17:46)  T(F): 98.7 (22 Jan 2025 06:15), Max: 99.3 (21 Jan 2025 17:46)  HR: 62 (22 Jan 2025 06:15) (62 - 72)  BP: 155/90 (22 Jan 2025 06:15) (152/70 - 178/81)  RR: 16 (22 Jan 2025 06:15) (14 - 17)  SpO2: 98% (22 Jan 2025 06:15) (98% - 100%)    Parameters below as of 22 Jan 2025 06:15  Patient On (Oxygen Delivery Method): room air      PHYSICAL EXAM:  General: NAD  Neuro:  Alert & oriented x 3  HEENT: NCAT, EOMI, conjunctiva clear  CV: Regular rate and rhythm  Lung: Respirations nonlabored, good inspiratory effort  Abdomen: soft, NTND  Extremities: no pedal edema or calf tenderness noted         LABS:                        13.5   11.51 )-----------( 191      ( 22 Jan 2025 05:35 )             41.8     01-22    134[L]  |  101  |  22  ----------------------------<  97  3.6   |  26  |  0.75    Ca    8.2[L]      22 Jan 2025 05:35    TPro  7.5  /  Alb  3.0[L]  /  TBili  0.6  /  DBili  0.1  /  AST  16  /  ALT  9[L]  /  AlkPhos  80  01-22      Urinalysis Basic - ( 22 Jan 2025 05:35 )    Color: x / Appearance: x / SG: x / pH: x  Gluc: 97 mg/dL / Ketone: x  / Bili: x / Urobili: x   Blood: x / Protein: x / Nitrite: x   Leuk Esterase: x / RBC: x / WBC x   Sq Epi: x / Non Sq Epi: x / Bacteria: x        
Neurology consult    MEDICATIONS:    aspirin enteric coated 81 milliGRAM(s) Oral daily  atorvastatin 80 milliGRAM(s) Oral at bedtime  heparin   Injectable 5000 Unit(s) SubCutaneous every 12 hours  lactated ringers. 1000 milliLiter(s) IV Continuous <Continuous>  lisinopril 10 milliGRAM(s) Oral daily  metoprolol succinate ER 25 milliGRAM(s) Oral daily  ondansetron Injectable 4 milliGRAM(s) IV Push every 8 hours PRN  predniSONE   Tablet 1 milliGRAM(s) Oral daily      LABS:                          13.5   11.51 )-----------( 191      ( 22 Jan 2025 05:35 )             41.8     01-22    134[L]  |  101  |  22  ----------------------------<  97  3.6   |  26  |  0.75    Ca    8.2[L]      22 Jan 2025 05:35    TPro  7.5  /  Alb  3.0[L]  /  TBili  0.6  /  DBili  0.1  /  AST  16  /  ALT  9[L]  /  AlkPhos  80  01-22    CAPILLARY BLOOD GLUCOSE          Urinalysis Basic - ( 22 Jan 2025 05:35 )    Color: x / Appearance: x / SG: x / pH: x  Gluc: 97 mg/dL / Ketone: x  / Bili: x / Urobili: x   Blood: x / Protein: x / Nitrite: x   Leuk Esterase: x / RBC: x / WBC x   Sq Epi: x / Non Sq Epi: x / Bacteria: x      I&O's Summary    Vital Signs Last 24 Hrs  T(C): 37.1 (22 Jan 2025 06:15), Max: 37.4 (21 Jan 2025 17:46)  T(F): 98.7 (22 Jan 2025 06:15), Max: 99.3 (21 Jan 2025 17:46)  HR: 62 (22 Jan 2025 06:15) (62 - 72)  BP: 155/90 (22 Jan 2025 06:15) (152/70 - 183/83)  BP(mean): --  RR: 16 (22 Jan 2025 06:15) (14 - 22)  SpO2: 98% (22 Jan 2025 06:15) (98% - 100%)    Parameters below as of 22 Jan 2025 06:15  Patient On (Oxygen Delivery Method): room air            On Neurological Examination:    Neuro: AAOx3; knows age, month, obeys commands, no dysarthria; calculations intact, fluent names, repeats     CN: PERRL, EOMI, VFF normal gaze preference, no facial palsy,     Motor: bilaterally LE some effort    Sensory: Intact to LT and PP no extinction    Coordination: FTN intact b/l                RADIOLOGY  < from: CT Head No Cont (01.20.25 @ 08:53) >  Impression: Acute left MCA infarct cannot be entirely excluded as   described above.    < end of copied text >

## 2025-01-25 NOTE — PROGRESS NOTE ADULT - REASON FOR ADMISSION
abd pain

## 2025-01-25 NOTE — PROGRESS NOTE ADULT - ASSESSMENT
##Abdominal Pain  #Nausea and Vomiting  History of epigastric abdominal pain for 1 day accompanied by nausea and vomiting. Denies fever, cough, shortness of breath, diarrhea, rash, or paresthesias. CT of abdomen/pelvis reviewed: status post cholecystectomy, mild intrahepatic ductal dilatation, and retained stone with normal common bile duct.  Seen by surgery; no intervention required. MRCP reviewed Severely limited motion degraded MRI- Pending MCRP. A few areas of low T2 signal within the common duct could represent   pneumobilia, however choledocholithiasis is not excluded. No biliary ductal dilatation.  - GI, Gen surg following     #Acute CVA  CT head concerning for acute left middle cerebral artery (L MCA) infarct that cannot be ruled out. No identified cause, patient in normal sinus rhythm (NSR). No motor weakness noted; patient can raise both arms and legs, but is at baseline function and requires an assistive device. A1c 5.4. LDL61. MRI negative for acute CVA.   - Stop ASA 81/d per Neuro, C/w Statin   - Past permissive htn -> Needs good control   - PT/OT/Speech eval -> family wants home PT    #HTN  Now past permissive HTN window. Discussed with patients son who would like to lower Lisinorpril dose as she had a bad reaction to full dose in the past.   -  Reduce Lisinopril 10/d, C/w amlodipine 10/d    #Rheumatoid Arthritis (RA)  - C/w daily prednisone 1 mg  - Taking leflunomide, which is a non-formulary medication; family to bring it to the hospital.    #Breast Cancer History  Status post left mastectomy in 2012. No active treatment for breast cancer at present.    Diet: regular   DVT prophylaxis: SQ heparin   Dispo: D/c tele, eventual home with home pt   Code Status: FC      I have spent a total of 35 minutes to prepare to see the patient, obtaining and reviewing history, physical examination, explaining the diagnosis, prognosis and treatment plan with the patient/family/caregiver. I also have spent the time ordering studies and testing, interpreting results, medicine reconciliation, IDRs, subspecialty consultation and documentation as above.     ##Abdominal Pain  #Nausea and Vomiting  History of epigastric abdominal pain for 1 day accompanied by nausea and vomiting. Denies fever, cough, shortness of breath, diarrhea, rash, or paresthesias. CT of abdomen/pelvis reviewed: status post cholecystectomy, mild intrahepatic ductal dilatation, and retained stone with normal common bile duct.  Seen by surgery; no intervention required. MRCP reviewed Severely limited motion degraded MRI- Pending MCRP. A few areas of low T2 signal within the common duct could represent   pneumobilia, however choledocholithiasis is not excluded. No biliary ductal dilatation.  - MRCP findings discussed with GI Dr. Iris Walls who will see patient today.   - GI, Gen surg following     #Acute CVA  CT head concerning for acute left middle cerebral artery (L MCA) infarct that cannot be ruled out. No identified cause, patient in normal sinus rhythm (NSR). No motor weakness noted; patient can raise both arms and legs, but is at baseline function and requires an assistive device. A1c 5.4. LDL61. MRI negative for acute CVA.   - Stop ASA 81/d per Neuro, C/w Statin   - Past permissive htn -> Needs good control   - PT/OT/Speech eval -> family wants home PT    #HTN  Now past permissive HTN window. Discussed with patients son who would like to lower Lisinorpril dose as she had a bad reaction to full dose in the past.   -  Reduce Lisinopril 10/d, C/w amlodipine 10/d    #Rheumatoid Arthritis (RA)  - C/w daily prednisone 1 mg  - Taking leflunomide, which is a non-formulary medication; family to bring it to the hospital.    #Breast Cancer History  Status post left mastectomy in 2012. No active treatment for breast cancer at present.    Diet: regular   DVT prophylaxis: SQ heparin   Dispo: D/c tele, eventual home with home pt   Code Status: FC      I have spent a total of 35 minutes to prepare to see the patient, obtaining and reviewing history, physical examination, explaining the diagnosis, prognosis and treatment plan with the patient/family/caregiver. I also have spent the time ordering studies and testing, interpreting results, medicine reconciliation, IDRs, subspecialty consultation and documentation as above.

## 2025-01-25 NOTE — PROGRESS NOTE ADULT - PROVIDER SPECIALTY LIST ADULT
Gastroenterology
Hospitalist
Neurology
Surgery
Hospitalist
Neurology
Neurology
Surgery
Cardiology
Hospitalist
Hospitalist
Internal Medicine
Neurology
Surgery

## 2025-01-26 VITALS
TEMPERATURE: 99 F | HEART RATE: 62 BPM | OXYGEN SATURATION: 100 % | SYSTOLIC BLOOD PRESSURE: 109 MMHG | DIASTOLIC BLOOD PRESSURE: 69 MMHG | RESPIRATION RATE: 18 BRPM

## 2025-01-26 PROCEDURE — 99239 HOSP IP/OBS DSCHRG MGMT >30: CPT

## 2025-01-26 RX ORDER — METOPROLOL SUCCINATE 25 MG
1 TABLET, EXTENDED RELEASE 24 HR ORAL
Qty: 30 | Refills: 2
Start: 2025-01-26 | End: 2025-04-25

## 2025-01-26 RX ORDER — AMLODIPINE BESYLATE 5 MG
1 TABLET ORAL
Qty: 0 | Refills: 0 | DISCHARGE
Start: 2025-01-26

## 2025-01-26 RX ORDER — ATORVASTATIN CALCIUM 80 MG/1
1 TABLET, FILM COATED ORAL
Qty: 0 | Refills: 0 | DISCHARGE
Start: 2025-01-26

## 2025-01-26 RX ORDER — AMLODIPINE BESYLATE 5 MG
1 TABLET ORAL
Qty: 30 | Refills: 3
Start: 2025-01-26 | End: 2025-05-25

## 2025-01-26 RX ORDER — METOPROLOL SUCCINATE 25 MG
1 TABLET, EXTENDED RELEASE 24 HR ORAL
Qty: 0 | Refills: 0 | DISCHARGE
Start: 2025-01-26

## 2025-01-26 RX ADMIN — Medication 25 MILLIGRAM(S): at 05:42

## 2025-01-26 RX ADMIN — FAMOTIDINE 20 MILLIGRAM(S): 10 INJECTION INTRAVENOUS at 12:04

## 2025-01-26 RX ADMIN — Medication 10 MILLIGRAM(S): at 05:42

## 2025-01-26 RX ADMIN — Medication 10 MILLIGRAM(S): at 05:43

## 2025-01-26 RX ADMIN — Medication 5000 UNIT(S): at 17:01

## 2025-01-26 RX ADMIN — Medication 5000 UNIT(S): at 05:42

## 2025-01-26 RX ADMIN — PREDNISONE 1 MILLIGRAM(S): 5 TABLET ORAL at 05:42

## 2025-01-26 RX ADMIN — SODIUM CHLORIDE 80 MILLILITER(S): 9 INJECTION, SOLUTION INTRAVENOUS at 05:43

## 2025-01-26 NOTE — DISCHARGE NOTE PROVIDER - NSDCCAREPROVSEEN_GEN_ALL_CORE_FT
Malvin, Jono Dee, Jonatan Plata, Alyssa Romero, Amber Armstrong, Helena Van, Mann Quintero, Johana Mirza, Sonu Hendrickson, Tyshawn Roberto, Shravan ORTIZ

## 2025-01-26 NOTE — DISCHARGE NOTE NURSING/CASE MANAGEMENT/SOCIAL WORK - NSDCFUADDAPPT_GEN_ALL_CORE_FT
APPTS ARE READY TO BE MADE: [X] YES    It is important to see your primary physician as well as the physicians noted below within the next week to perform a comprehensive medical review.  Call their offices for an appointment as soon as you leave the hospital.  You will also need to see them for renewal of your medications.  If you do not have a primary physician, contact the [facility] Physician Referral Service at [phone].  To obtain your results, you can access the [employer] Patient Portal at [email or web address].  Your medical issues appear to be stable at this time, but if your symptoms recur or worsen, contact your physicians and/or return to the hospital if necessary.  If you encounter any issues or questions with your medication, call your physicians before stopping the medication.

## 2025-01-26 NOTE — DISCHARGE NOTE PROVIDER - ATTENDING DISCHARGE PHYSICAL EXAMINATION:
Appearance: NAD	  Cardiovascular: RRR, Normal S1 S2, No JVD  Respiratory: Lungs clear to auscultation	  Gastrointestinal:  Soft, Non-tender, + BS	  Skin: No rash, No ecchymoses

## 2025-01-26 NOTE — DISCHARGE NOTE PROVIDER - NSDCCPCAREPLAN_GEN_ALL_CORE_FT
PRINCIPAL DISCHARGE DIAGNOSIS  Diagnosis: Abdominal pain  Assessment and Plan of Treatment: Please follow up with GI doctor Iirs Onu

## 2025-01-26 NOTE — DISCHARGE NOTE PROVIDER - CARE PROVIDER_API CALL
Michelle Coombs  Gastroenterology  [address] [place] [id number]  Phone: [phone]  Fax: [phone]  Follow Up Time: 1 week    Primary care physician,   Phone: (   )    -  Fax: (   )    -  Follow Up Time: 1 week

## 2025-01-26 NOTE — DISCHARGE NOTE PROVIDER - NSDCFUADDAPPT_GEN_ALL_CORE_FT
APPTS ARE READY TO BE MADE: [X] YES    It is important to see your primary physician as well as the physicians noted below within the next week to perform a comprehensive medical review.  Call their offices for an appointment as soon as you leave the hospital.  You will also need to see them for renewal of your medications.  If you do not have a primary physician, contact the [facility] Physician Referral Service at [phone].  To obtain your results, you can access the [facility] Patient Portal at [email or web address].  Your medical issues appear to be stable at this time, but if your symptoms recur or worsen, contact your physicians and/or return to the hospital if necessary.  If you encounter any issues or questions with your medication, call your physicians before stopping the medication. APPTS ARE READY TO BE MADE: [X] YES    It is important to see your primary physician as well as the physicians noted below within the next week to perform a comprehensive medical review.  Call their offices for an appointment as soon as you leave the hospital.  You will also need to see them for renewal of your medications.  If you do not have a primary physician, contact the [facility] Physician Referral Service at [phone].  To obtain your results, you can access the [employer] Patient Portal at [email or web address].  Your medical issues appear to be stable at this time, but if your symptoms recur or worsen, contact your physicians and/or return to the hospital if necessary.  If you encounter any issues or questions with your medication, call your physicians before stopping the medication.    Patient informed us they already have secured a follow up appointment which is not visible on [facility] and declined to provide appointment details. Patient advised they followed up with the PCP and gastroenterologist, spoke with patients son.

## 2025-01-26 NOTE — DISCHARGE NOTE PROVIDER - HOSPITAL COURSE
##Abdominal Pain  #Nausea and Vomiting  History of epigastric abdominal pain for 1 day accompanied by nausea and vomiting. Denies fever, cough, shortness of breath, diarrhea, rash, or paresthesias. CT of abdomen/pelvis reviewed: status post cholecystectomy, mild intrahepatic ductal dilatation, and retained stone with normal common bile duct.  Seen by surgery; no intervention required. MRCP reviewed Severely limited motion degraded MRI- Pending MCRP. A few areas of low T2 signal within the common duct could represent pneumobilia, however choledocholithiasis is not excluded. No biliary ductal dilatation.   MRCP findings discussed with GI Dr. Iris Walls who evaluated patient.  Per GI, Abdominal pain improved and LFTs WNL. Can follow up outpatient.   Gen surg evaluated no surgical intervention.     #Abnormal head CT imaging   CT head concerning for acute left middle cerebral artery (L MCA) infarct that cannot be ruled out. No identified cause, patient in normal sinus rhythm (NSR). No motor weakness noted; patient can raise both arms and legs, but is at baseline function and requires an assistive device. A1c 5.4. LDL61. MRI negative for acute CVA.   - Stop ASA 81/d per Neuro, C/w Statin   - Will need good BP control   - PT/OT/Speech eval -> family wants home PT    #HTN  Now past permissive HTN window. Discussed with patients son who would like to lower Lisinorpril dose as she had a bad reaction to full dose in the past.   -  C/w BB 25/d, Lisinopril 10/d, C/w amlodipine 10/d    #Rheumatoid Arthritis (RA)  - C/w daily prednisone 1 mg, leflunomide    #Breast Cancer History  Status post left mastectomy in 2012. No active treatment for breast cancer at present.    Stable for discharge home with PcP and GI followup. Discussed with patient and family who are in agreement with the plan. They are in agreement with discharge planning. All questions answered. ##Abdominal Pain  #Nausea and Vomiting  History of epigastric abdominal pain for 1 day accompanied by nausea and vomiting. Denies fever, cough, shortness of breath, diarrhea, rash, or paresthesias. CT of abdomen/pelvis reviewed: status post cholecystectomy, mild intrahepatic ductal dilatation, and retained stone with normal common bile duct.  Seen by surgery; no intervention required. MRCP reviewed Severely limited motion degraded MRI- Pending MCRP. A few areas of low T2 signal within the common duct could represent pneumobilia, however choledocholithiasis is not excluded. No biliary ductal dilatation.   MRCP findings discussed with GI Dr. Iris Walsl who evaluated patient.  Per GI, Abdominal pain improved and LFTs WNL. Can follow up outpatient.   Gen surg evaluated no surgical intervention.     #Abnormal head CT imaging   CT head concerning for acute left middle cerebral artery (L MCA) infarct that cannot be ruled out. No identified cause, patient in normal sinus rhythm (NSR). No motor weakness noted; patient can raise both arms and legs, but is at baseline function and requires an assistive device. A1c 5.4. LDL61. MRI negative for acute CVA.   - Stop ASA 81/d per Neuro, C/w Statin   - Will need good BP control   - PT/OT/Speech eval -> family wants home PT    #HTN  Now past permissive HTN window. Discussed with patients son who would like to lower Lisinorpril dose as she had a bad reaction to full dose in the past.   -  C/w BB 25/d, Lisinopril 10/d, C/w amlodipine 10/d    #Rheumatoid Arthritis (RA)  - C/w daily prednisone 1 mg, leflunomide    #Breast Cancer History  Status post left mastectomy in 2012. No active treatment for breast cancer at present.    Stable for discharge home with PcP and GI followup. Discussed with patient and family who are in agreement with the plan. They are in agreement with discharge planning. All  questions answered.

## 2025-01-26 NOTE — DISCHARGE NOTE PROVIDER - NSDCMRMEDTOKEN_GEN_ALL_CORE_FT
amLODIPine 10 mg oral tablet: 1 tab(s) orally once a day  atorvastatin 80 mg oral tablet: 1 tab(s) orally once a day (at bedtime)  leflunomide 10 mg oral tablet: 1 tab(s) orally once a day, As Needed  lisinopril 5 mg oral tablet: 1 tab(s) orally once a day  metoprolol succinate 25 mg oral tablet, extended release: 1 tab(s) orally once a day  predniSONE 1 mg oral tablet: 1 tab(s) orally once a day

## 2025-01-26 NOTE — DISCHARGE NOTE NURSING/CASE MANAGEMENT/SOCIAL WORK - PATIENT PORTAL LINK FT
You can access the FollowMyHealth Patient Portal offered by [facility] by registering at the following website: [email or web address]. By joining [employer]’s FollowMyHealth portal, you will also be able to view your health information using other applications (apps) compatible with our system.

## 2025-01-26 NOTE — DISCHARGE NOTE NURSING/CASE MANAGEMENT/SOCIAL WORK - FINANCIAL ASSISTANCE
[facility] provides services at a reduced cost to those who are determined to be eligible through [facility]’s financial assistance program. Information regarding [facility]’s financial assistance program can be found by going to [email or web address] or by calling [phone].

## 2025-01-26 NOTE — DISCHARGE NOTE PROVIDER - PROVIDER TOKENS
PROVIDER:[TOKEN:[[id number]],FOLLOWUP:[1 week]],FREE:[LAST:[Primary care physician],PHONE:[(   )    -],FAX:[(   )    -],FOLLOWUP:[1 week]]

## 2025-01-30 RX ORDER — ATORVASTATIN CALCIUM 80 MG/1
1 TABLET, FILM COATED ORAL
Qty: 30 | Refills: 3
Start: 2025-01-30 | End: 2025-05-29

## 2025-02-18 NOTE — PHYSICAL THERAPY INITIAL EVALUATION ADULT - BED MOBILITY LIMITATIONS, REHAB EVAL
decreased ability to use legs for bridging/pushing
decreased ability to use legs for bridging/pushing
stretcher
